# Patient Record
Sex: MALE | NOT HISPANIC OR LATINO | Employment: FULL TIME | URBAN - METROPOLITAN AREA
[De-identification: names, ages, dates, MRNs, and addresses within clinical notes are randomized per-mention and may not be internally consistent; named-entity substitution may affect disease eponyms.]

---

## 2023-06-06 ENCOUNTER — TELEPHONE (OUTPATIENT)
Dept: PRIMARY CARE | Facility: CLINIC | Age: 48
End: 2023-06-06
Payer: COMMERCIAL

## 2023-06-06 DIAGNOSIS — F90.9 ATTENTION DEFICIT HYPERACTIVITY DISORDER (ADHD), UNSPECIFIED ADHD TYPE: Primary | ICD-10-CM

## 2023-06-06 PROBLEM — I10 HYPERTENSION: Status: ACTIVE | Noted: 2023-06-06

## 2023-06-06 PROBLEM — F41.9 ANXIETY: Status: ACTIVE | Noted: 2023-06-06

## 2023-06-06 PROBLEM — R63.5 WEIGHT GAIN: Status: ACTIVE | Noted: 2023-06-06

## 2023-06-06 PROBLEM — E78.5 BORDERLINE HYPERLIPIDEMIA: Status: ACTIVE | Noted: 2023-06-06

## 2023-06-06 PROBLEM — N40.0 ENLARGED PROSTATE: Status: ACTIVE | Noted: 2023-06-06

## 2023-06-06 PROBLEM — E55.9 VITAMIN D DEFICIENCY: Status: ACTIVE | Noted: 2023-06-06

## 2023-06-06 RX ORDER — LISINOPRIL 20 MG/1
1 TABLET ORAL DAILY
COMMUNITY
Start: 2014-12-04 | End: 2023-07-03 | Stop reason: SDUPTHER

## 2023-06-06 RX ORDER — BUSPIRONE HYDROCHLORIDE 15 MG/1
15 TABLET ORAL 2 TIMES DAILY
COMMUNITY
Start: 2017-04-10 | End: 2023-07-03 | Stop reason: SDUPTHER

## 2023-06-06 RX ORDER — DEXTROAMPHETAMINE SACCHARATE, AMPHETAMINE ASPARTATE, DEXTROAMPHETAMINE SULFATE AND AMPHETAMINE SULFATE 3.75; 3.75; 3.75; 3.75 MG/1; MG/1; MG/1; MG/1
1 TABLET ORAL 2 TIMES DAILY
COMMUNITY
Start: 2023-05-02 | End: 2023-06-10 | Stop reason: SDUPTHER

## 2023-06-06 NOTE — TELEPHONE ENCOUNTER
Refill(s) requested for: Generic Adderall (15 mg)    Pharmacy: [facility]  Pharmacy Address: [id number] [facility]    LR: 04/24/2023  LV: 12/27/2022  NV: 07/03/2023

## 2023-06-10 RX ORDER — DEXTROAMPHETAMINE SACCHARATE, AMPHETAMINE ASPARTATE, DEXTROAMPHETAMINE SULFATE AND AMPHETAMINE SULFATE 3.75; 3.75; 3.75; 3.75 MG/1; MG/1; MG/1; MG/1
1 TABLET ORAL 2 TIMES DAILY
Qty: 60 TABLET | Refills: 0 | Status: SHIPPED | OUTPATIENT
Start: 2023-06-10 | End: 2023-07-03 | Stop reason: RX

## 2023-07-03 ENCOUNTER — OFFICE VISIT (OUTPATIENT)
Dept: PRIMARY CARE | Facility: CLINIC | Age: 48
End: 2023-07-03
Payer: COMMERCIAL

## 2023-07-03 VITALS
HEART RATE: 62 BPM | DIASTOLIC BLOOD PRESSURE: 78 MMHG | BODY MASS INDEX: 31.86 KG/M2 | OXYGEN SATURATION: 97 % | SYSTOLIC BLOOD PRESSURE: 122 MMHG | WEIGHT: 203 LBS | HEIGHT: 67 IN

## 2023-07-03 DIAGNOSIS — N40.0 BENIGN PROSTATIC HYPERPLASIA, UNSPECIFIED WHETHER LOWER URINARY TRACT SYMPTOMS PRESENT: ICD-10-CM

## 2023-07-03 DIAGNOSIS — F90.0 ATTENTION DEFICIT HYPERACTIVITY DISORDER (ADHD), PREDOMINANTLY INATTENTIVE TYPE: Primary | ICD-10-CM

## 2023-07-03 DIAGNOSIS — I10 HYPERTENSION, UNSPECIFIED TYPE: ICD-10-CM

## 2023-07-03 DIAGNOSIS — E29.1 HYPOGONADISM MALE: ICD-10-CM

## 2023-07-03 DIAGNOSIS — R53.83 FATIGUE, UNSPECIFIED TYPE: ICD-10-CM

## 2023-07-03 DIAGNOSIS — F41.9 ANXIETY: ICD-10-CM

## 2023-07-03 DIAGNOSIS — Z00.00 ENCOUNTER FOR HEALTH MAINTENANCE EXAMINATION: ICD-10-CM

## 2023-07-03 DIAGNOSIS — Z13.220 SCREENING, LIPID: ICD-10-CM

## 2023-07-03 DIAGNOSIS — E55.9 VITAMIN D DEFICIENCY: ICD-10-CM

## 2023-07-03 PROCEDURE — 99213 OFFICE O/P EST LOW 20 MIN: CPT | Performed by: FAMILY MEDICINE

## 2023-07-03 PROCEDURE — 3074F SYST BP LT 130 MM HG: CPT | Performed by: FAMILY MEDICINE

## 2023-07-03 PROCEDURE — 3078F DIAST BP <80 MM HG: CPT | Performed by: FAMILY MEDICINE

## 2023-07-03 RX ORDER — LISINOPRIL 20 MG/1
20 TABLET ORAL DAILY
Qty: 90 TABLET | Refills: 2 | Status: SHIPPED | OUTPATIENT
Start: 2023-07-03 | End: 2023-12-29 | Stop reason: SDUPTHER

## 2023-07-03 RX ORDER — DEXTROAMPHETAMINE SACCHARATE, AMPHETAMINE ASPARTATE MONOHYDRATE, DEXTROAMPHETAMINE SULFATE AND AMPHETAMINE SULFATE 3.75; 3.75; 3.75; 3.75 MG/1; MG/1; MG/1; MG/1
15 CAPSULE, EXTENDED RELEASE ORAL EVERY MORNING
Qty: 30 CAPSULE | Refills: 0 | Status: SHIPPED | OUTPATIENT
Start: 2023-09-02 | End: 2023-07-11 | Stop reason: SDUPTHER

## 2023-07-03 RX ORDER — DEXTROAMPHETAMINE SACCHARATE, AMPHETAMINE ASPARTATE MONOHYDRATE, DEXTROAMPHETAMINE SULFATE AND AMPHETAMINE SULFATE 3.75; 3.75; 3.75; 3.75 MG/1; MG/1; MG/1; MG/1
15 CAPSULE, EXTENDED RELEASE ORAL EVERY MORNING
Qty: 30 CAPSULE | Refills: 0 | Status: SHIPPED | OUTPATIENT
Start: 2023-07-08 | End: 2023-07-11 | Stop reason: SDUPTHER

## 2023-07-03 RX ORDER — BUSPIRONE HYDROCHLORIDE 15 MG/1
15 TABLET ORAL DAILY PRN
Qty: 90 TABLET | Refills: 2 | Status: SHIPPED | OUTPATIENT
Start: 2023-07-03

## 2023-07-03 RX ORDER — DEXTROAMPHETAMINE SACCHARATE, AMPHETAMINE ASPARTATE MONOHYDRATE, DEXTROAMPHETAMINE SULFATE AND AMPHETAMINE SULFATE 3.75; 3.75; 3.75; 3.75 MG/1; MG/1; MG/1; MG/1
15 CAPSULE, EXTENDED RELEASE ORAL EVERY MORNING
Qty: 30 CAPSULE | Refills: 0 | Status: SHIPPED | OUTPATIENT
Start: 2023-08-05 | End: 2023-07-11 | Stop reason: SDUPTHER

## 2023-07-03 ASSESSMENT — PATIENT HEALTH QUESTIONNAIRE - PHQ9
SUM OF ALL RESPONSES TO PHQ9 QUESTIONS 1 & 2: 0
1. LITTLE INTEREST OR PLEASURE IN DOING THINGS: NOT AT ALL
2. FEELING DOWN, DEPRESSED OR HOPELESS: NOT AT ALL

## 2023-07-03 NOTE — PROGRESS NOTES
General Medical Management Note    47 y.o. male presents for Medical Management  HPI    Temporarily without medical insurance.    ADD is well controlled with Adderall.  He is having difficulty finding the medication due to nationwide national shortage.  He prefers the extended release and states that seems to be easier to find an immediate release.  Extended release 30 mg is also difficult to find.  He denies side effects such as tremors, insomnia or palpitations.    Anxiety is well controlled.  He is only using BuSpar on an as-needed basis.    Hypertension: Fairly compliant with lisinopril.    Weight management: Patient is struggling to lose 10 to 15 pounds to get to overweight BMI.  We reviewed reasons for weight loss.    Past Medical History:   Diagnosis Date    Elevated blood-pressure reading, without diagnosis of hypertension 12/10/2013    Elevated blood pressure reading without diagnosis of hypertension    Fracture of unspecified phalanx of unspecified finger, initial encounter for closed fracture 12/10/2013    Closed fracture of one or more phalanges of hand    Personal history of urinary calculi 12/01/2017    History of renal calculi      Past Surgical History:   Procedure Laterality Date    ANTERIOR CRUCIATE LIGAMENT REPAIR  12/04/2014    Primary Repair Of Knee Ligament Cruciate Anterior     No family history on file.   Social History     Socioeconomic History    Marital status: Single     Spouse name: Not on file    Number of children: Not on file    Years of education: Not on file    Highest education level: Not on file   Occupational History    Not on file   Tobacco Use    Smoking status: Never    Smokeless tobacco: Current     Types: Chew   Substance and Sexual Activity    Alcohol use: Yes    Drug use: Never    Sexual activity: Not on file   Other Topics Concern    Not on file   Social History Narrative    Not on file     Social Determinants of Health     Financial Resource Strain: Not on file   Food  "Insecurity: Not on file   Transportation Needs: Not on file   Physical Activity: Not on file   Stress: Not on file   Social Connections: Not on file   Intimate Partner Violence: Not on file   Housing Stability: Not on file       Current Outpatient Medications on File Prior to Visit   Medication Sig Dispense Refill    [DISCONTINUED] amphetamine-dextroamphetamine (Adderall) 15 mg tablet Take 1 tablet (15 mg) by mouth 2 times a day. 60 tablet 0    [DISCONTINUED] busPIRone (Buspar) 15 mg tablet Take 1 tablet (15 mg) by mouth twice a day.      [DISCONTINUED] lisinopril 20 mg tablet Take 1 tablet (20 mg) by mouth once daily.       No current facility-administered medications on file prior to visit.       Allergies   Allergen Reactions    Penicillins Unknown         ROS: Denies chest pain, SOB, Headache, GI problems     Visit Vitals  /78   Pulse 62   Ht 1.702 m (5' 7\")   Wt 92.1 kg (203 lb)   SpO2 97%   BMI 31.79 kg/m²   Smoking Status Never   BSA 2.09 m²        PHYSICAL EXAM:  Alert and oriented x3.  Eyes: EOM grossly intact  Neck supple without lymph adenopathy or carotid bruit.  No masses or thyromegaly  Heart regular rate and rhythm without murmur.  Lungs clear to auscultation.  Legs without edema.  Gait is non-antalgic  Speech clear.  Hearing adequate.          DIAGNOSIS/PLAN:    1. Attention deficit hyperactivity disorder (ADHD), predominantly inattentive type  Patient is aware of Dr. Lynn's and Lara García's practice rules for use of scheduled medication.  Has a signed contract stating that patient will only receive controlled substance prescriptions from Dr. Lynn, will only receive a one month supply, will fill prescriptions at one pharmacy, and agrees to a random urine drug screen.  Patient is aware that she must have an office appointment every 90 days to continue to receive benzodiazepines or narcotics.    -3 prescriptions printed today so that patient can find a pharmacy that has this medication and " dose in stock.  -Send message when starting third prescription.  Patient is aware that 3 additional prescriptions will be printed or eRx'd as long as he has a future appointment.    - amphetamine-dextroamphetamine XR (Adderall XR) 15 mg 24 hr capsule; Take 1 capsule (15 mg) by mouth once daily in the morning. Do not crush or chew. Do not start before July 8, 2023.  Dispense: 30 capsule; Refill: 0  - amphetamine-dextroamphetamine XR (Adderall XR) 15 mg 24 hr capsule; Take 1 capsule (15 mg) by mouth once daily in the morning. Do not crush or chew. Do not start before August 5, 2023.  Dispense: 30 capsule; Refill: 0  - amphetamine-dextroamphetamine XR (Adderall XR) 15 mg 24 hr capsule; Take 1 capsule (15 mg) by mouth once daily in the morning. Do not crush or chew. Do not start before September 2, 2023.  Dispense: 30 capsule; Refill: 0    2. Anxiety  - busPIRone (Buspar) 15 mg tablet; Take 1 tablet (15 mg) by mouth once daily as needed (anxiety).  Dispense: 90 tablet; Refill: 2    3. Hypertension, unspecified type  Hypertension: Discussed importance of good blood pressure control to avoid long-term complications such as heart attack and stroke.  Patient is aware that blood pressure goal is less than 130/80.  Maintaining a regular exercise program and body mass index (BMI) less than 25 as well as a diet lower in carbohydrates will help reach these goals.  - ECG 12 lead (Clinic Performed)  - lisinopril 20 mg tablet; Take 1 tablet (20 mg) by mouth once daily.  Dispense: 90 tablet; Refill: 2    4. Vitamin D deficiency  - Vitamin D 1,25 Dihydroxy; Future    5. Encounter for health maintenance examination  Lab will be rescheduled to coincide with his annual comprehensive medical evaluation later this year  - Comprehensive Metabolic Panel; Future  - CBC; Future  - Lipid Panel; Future  - TSH with reflex to Free T4 if abnormal; Future  - Testosterone; Future  - Prostate Specific Antigen; Future  - Vitamin D 1,25 Dihydroxy;  Future  - Urinalysis with Reflex Microscopic; Future        Return to office in 6 months for comprehensive medical evaluation, long-term medication use monitoring, and preventative services screening    We will continue to monitor, evaluate, assess and treat all problems/diagnoses as appropriate and continue to collaborate with specialists.    Encouraged to sign up with [facility]    Contact office or send a Follow  Health message with any questions or concerns    Patient will only be notified of labs that require medical intervention.    Prescriptions will not be filled unless you are compliant with your follow up appointments or have a follow up appointment scheduled as per instruction of your physician. Refills should be requested at the time of your visit.    **Charting was completed using voice recognition technology and may include unintended errors**    Harlan Lynn DO, ROSA  [address] [facility], [address]  [place] [address] [phone]          Harlan Lynn DO, JOHANNAP

## 2023-07-10 ENCOUNTER — TELEPHONE (OUTPATIENT)
Dept: PRIMARY CARE | Facility: CLINIC | Age: 48
End: 2023-07-10
Payer: COMMERCIAL

## 2023-07-10 DIAGNOSIS — F90.0 ATTENTION DEFICIT HYPERACTIVITY DISORDER (ADHD), PREDOMINANTLY INATTENTIVE TYPE: ICD-10-CM

## 2023-07-10 NOTE — TELEPHONE ENCOUNTER
Pt is calling in regards to his prescriptions that you printed for his Adderall. Pt states that he takes Amphetamine - Dextroamphetamine XR 15 MG twice a day. On the prescriptions you only gave him 30 tablets instead of 60 tablets. Pt is asking if you can correct all three prescriptions? Pt said he will bring in the three prescriptions that he has when he comes in to  the new ones. Pt asked for them to be printed and if someone can give him a call when they are ready?

## 2023-07-11 RX ORDER — DEXTROAMPHETAMINE SACCHARATE, AMPHETAMINE ASPARTATE MONOHYDRATE, DEXTROAMPHETAMINE SULFATE AND AMPHETAMINE SULFATE 3.75; 3.75; 3.75; 3.75 MG/1; MG/1; MG/1; MG/1
15 CAPSULE, EXTENDED RELEASE ORAL 2 TIMES DAILY
Qty: 60 CAPSULE | Refills: 0 | Status: SHIPPED | OUTPATIENT
Start: 2023-07-11 | End: 2023-07-12

## 2023-07-12 DIAGNOSIS — F90.0 ATTENTION DEFICIT HYPERACTIVITY DISORDER (ADHD), PREDOMINANTLY INATTENTIVE TYPE: ICD-10-CM

## 2023-07-12 RX ORDER — DEXTROAMPHETAMINE SACCHARATE, AMPHETAMINE ASPARTATE MONOHYDRATE, DEXTROAMPHETAMINE SULFATE AND AMPHETAMINE SULFATE 3.75; 3.75; 3.75; 3.75 MG/1; MG/1; MG/1; MG/1
15 CAPSULE, EXTENDED RELEASE ORAL 2 TIMES DAILY
Qty: 60 CAPSULE | Refills: 0 | Status: SHIPPED | OUTPATIENT
Start: 2023-07-12 | End: 2023-10-16 | Stop reason: SDUPTHER

## 2023-07-12 RX ORDER — DEXTROAMPHETAMINE SACCHARATE, AMPHETAMINE ASPARTATE MONOHYDRATE, DEXTROAMPHETAMINE SULFATE AND AMPHETAMINE SULFATE 3.75; 3.75; 3.75; 3.75 MG/1; MG/1; MG/1; MG/1
15 CAPSULE, EXTENDED RELEASE ORAL 2 TIMES DAILY
Qty: 60 CAPSULE | Refills: 0 | Status: SHIPPED | OUTPATIENT
Start: 2023-09-06 | End: 2023-10-16 | Stop reason: SDUPTHER

## 2023-07-12 RX ORDER — DEXTROAMPHETAMINE SACCHARATE, AMPHETAMINE ASPARTATE MONOHYDRATE, DEXTROAMPHETAMINE SULFATE AND AMPHETAMINE SULFATE 3.75; 3.75; 3.75; 3.75 MG/1; MG/1; MG/1; MG/1
15 CAPSULE, EXTENDED RELEASE ORAL 2 TIMES DAILY
Qty: 60 CAPSULE | Refills: 0 | Status: SHIPPED | OUTPATIENT
Start: 2023-08-09 | End: 2023-10-16 | Stop reason: SDUPTHER

## 2023-10-16 ENCOUNTER — TELEPHONE (OUTPATIENT)
Dept: PRIMARY CARE | Facility: CLINIC | Age: 48
End: 2023-10-16
Payer: COMMERCIAL

## 2023-10-16 DIAGNOSIS — F90.0 ATTENTION DEFICIT HYPERACTIVITY DISORDER (ADHD), PREDOMINANTLY INATTENTIVE TYPE: ICD-10-CM

## 2023-10-16 RX ORDER — DEXTROAMPHETAMINE SACCHARATE, AMPHETAMINE ASPARTATE MONOHYDRATE, DEXTROAMPHETAMINE SULFATE AND AMPHETAMINE SULFATE 3.75; 3.75; 3.75; 3.75 MG/1; MG/1; MG/1; MG/1
15 CAPSULE, EXTENDED RELEASE ORAL 2 TIMES DAILY
Qty: 60 CAPSULE | Refills: 0 | Status: SHIPPED | OUTPATIENT
Start: 2023-11-14 | End: 2023-12-29 | Stop reason: SDUPTHER

## 2023-10-16 RX ORDER — DEXTROAMPHETAMINE SACCHARATE, AMPHETAMINE ASPARTATE MONOHYDRATE, DEXTROAMPHETAMINE SULFATE AND AMPHETAMINE SULFATE 3.75; 3.75; 3.75; 3.75 MG/1; MG/1; MG/1; MG/1
15 CAPSULE, EXTENDED RELEASE ORAL 2 TIMES DAILY
Qty: 60 CAPSULE | Refills: 0 | Status: SHIPPED | OUTPATIENT
Start: 2023-10-17 | End: 2023-12-29 | Stop reason: SDUPTHER

## 2023-10-16 RX ORDER — DEXTROAMPHETAMINE SACCHARATE, AMPHETAMINE ASPARTATE MONOHYDRATE, DEXTROAMPHETAMINE SULFATE AND AMPHETAMINE SULFATE 3.75; 3.75; 3.75; 3.75 MG/1; MG/1; MG/1; MG/1
15 CAPSULE, EXTENDED RELEASE ORAL 2 TIMES DAILY
Qty: 60 CAPSULE | Refills: 0 | Status: SHIPPED | OUTPATIENT
Start: 2023-12-12 | End: 2023-12-29 | Stop reason: SDUPTHER

## 2023-10-16 NOTE — TELEPHONE ENCOUNTER
REFILL REQUEST    Med: Generic Adderall XR  Med Dose: 15 mg  Med Frequency: one cap twice daily    Pharmacy: PT REQUESTED THIS BE PRINTED  Pharmacy Address: PT REQUESTED THIS BE PRINTED    LR: 09/06/2023  LV: 07/03/2023  NV: 12/29/2023

## 2023-12-29 ENCOUNTER — OFFICE VISIT (OUTPATIENT)
Dept: PRIMARY CARE | Facility: CLINIC | Age: 48
End: 2023-12-29
Payer: COMMERCIAL

## 2023-12-29 VITALS
OXYGEN SATURATION: 99 % | HEART RATE: 67 BPM | BODY MASS INDEX: 31.08 KG/M2 | DIASTOLIC BLOOD PRESSURE: 72 MMHG | WEIGHT: 198 LBS | SYSTOLIC BLOOD PRESSURE: 120 MMHG | HEIGHT: 67 IN

## 2023-12-29 DIAGNOSIS — Z23 NEED FOR INFLUENZA VACCINATION: ICD-10-CM

## 2023-12-29 DIAGNOSIS — I10 HYPERTENSION, UNSPECIFIED TYPE: ICD-10-CM

## 2023-12-29 DIAGNOSIS — E66.09 CLASS 1 OBESITY DUE TO EXCESS CALORIES WITH SERIOUS COMORBIDITY AND BODY MASS INDEX (BMI) OF 31.0 TO 31.9 IN ADULT: ICD-10-CM

## 2023-12-29 DIAGNOSIS — F90.0 ATTENTION DEFICIT HYPERACTIVITY DISORDER (ADHD), PREDOMINANTLY INATTENTIVE TYPE: Primary | ICD-10-CM

## 2023-12-29 DIAGNOSIS — Z51.81 ENCOUNTER FOR THERAPEUTIC DRUG LEVEL MONITORING: ICD-10-CM

## 2023-12-29 DIAGNOSIS — Z02.83 ENCOUNTER FOR DRUG SCREENING: ICD-10-CM

## 2023-12-29 PROBLEM — E66.811 CLASS 1 OBESITY DUE TO EXCESS CALORIES WITH SERIOUS COMORBIDITY AND BODY MASS INDEX (BMI) OF 31.0 TO 31.9 IN ADULT: Status: ACTIVE | Noted: 2023-12-29

## 2023-12-29 PROCEDURE — 90471 IMMUNIZATION ADMIN: CPT | Performed by: FAMILY MEDICINE

## 2023-12-29 PROCEDURE — 90686 IIV4 VACC NO PRSV 0.5 ML IM: CPT | Performed by: FAMILY MEDICINE

## 2023-12-29 PROCEDURE — 99214 OFFICE O/P EST MOD 30 MIN: CPT | Performed by: FAMILY MEDICINE

## 2023-12-29 PROCEDURE — 3074F SYST BP LT 130 MM HG: CPT | Performed by: FAMILY MEDICINE

## 2023-12-29 PROCEDURE — 3078F DIAST BP <80 MM HG: CPT | Performed by: FAMILY MEDICINE

## 2023-12-29 PROCEDURE — 3008F BODY MASS INDEX DOCD: CPT | Performed by: FAMILY MEDICINE

## 2023-12-29 RX ORDER — LISINOPRIL 20 MG/1
20 TABLET ORAL DAILY
Qty: 90 TABLET | Refills: 2 | Status: SHIPPED | OUTPATIENT
Start: 2023-12-29

## 2023-12-29 RX ORDER — DEXTROAMPHETAMINE SACCHARATE, AMPHETAMINE ASPARTATE MONOHYDRATE, DEXTROAMPHETAMINE SULFATE AND AMPHETAMINE SULFATE 3.75; 3.75; 3.75; 3.75 MG/1; MG/1; MG/1; MG/1
15 CAPSULE, EXTENDED RELEASE ORAL 2 TIMES DAILY
Qty: 60 CAPSULE | Refills: 0 | Status: SHIPPED | OUTPATIENT
Start: 2024-01-21 | End: 2024-04-18 | Stop reason: SDUPTHER

## 2023-12-29 RX ORDER — DEXTROAMPHETAMINE SACCHARATE, AMPHETAMINE ASPARTATE MONOHYDRATE, DEXTROAMPHETAMINE SULFATE AND AMPHETAMINE SULFATE 3.75; 3.75; 3.75; 3.75 MG/1; MG/1; MG/1; MG/1
15 CAPSULE, EXTENDED RELEASE ORAL 2 TIMES DAILY
Qty: 60 CAPSULE | Refills: 0 | Status: SHIPPED | OUTPATIENT
Start: 2024-03-19 | End: 2024-04-18 | Stop reason: SDUPTHER

## 2023-12-29 RX ORDER — DEXTROAMPHETAMINE SACCHARATE, AMPHETAMINE ASPARTATE MONOHYDRATE, DEXTROAMPHETAMINE SULFATE AND AMPHETAMINE SULFATE 3.75; 3.75; 3.75; 3.75 MG/1; MG/1; MG/1; MG/1
15 CAPSULE, EXTENDED RELEASE ORAL 2 TIMES DAILY
Qty: 60 CAPSULE | Refills: 0 | Status: SHIPPED | OUTPATIENT
Start: 2024-02-20 | End: 2024-04-18 | Stop reason: SDUPTHER

## 2023-12-29 ASSESSMENT — PATIENT HEALTH QUESTIONNAIRE - PHQ9
1. LITTLE INTEREST OR PLEASURE IN DOING THINGS: NOT AT ALL
2. FEELING DOWN, DEPRESSED OR HOPELESS: NOT AT ALL
SUM OF ALL RESPONSES TO PHQ9 QUESTIONS 1 & 2: 0

## 2023-12-29 NOTE — PROGRESS NOTES
General Medical Management Note    48 y.o. male presents for Medical Management  HPI    ADD - twice daily Adderall XR.  Last dose today.  The medication continues to be beneficial with task completion, comprehension and organization.    Anxiety - no longer needs BusPar daily.  He occasionally takes the medication when he feels anxious.  His last dose was several weeks ago.    HTN - controlled with lisinopril 20 mg daily.  He had tried not taking the medication but found his blood pressure significantly elevated and has been compliant with medication ever since.    Obesity - losing weight.  Job very active.  Diet: portion control    Past Medical History:   Diagnosis Date    Elevated blood-pressure reading, without diagnosis of hypertension 12/10/2013    Elevated blood pressure reading without diagnosis of hypertension    Fracture of unspecified phalanx of unspecified finger, initial encounter for closed fracture 12/10/2013    Closed fracture of one or more phalanges of hand    Personal history of urinary calculi 12/01/2017    History of renal calculi      Past Surgical History:   Procedure Laterality Date    ANTERIOR CRUCIATE LIGAMENT REPAIR  12/04/2014    Primary Repair Of Knee Ligament Cruciate Anterior     No family history on file.   Social History     Socioeconomic History    Marital status: Single     Spouse name: Not on file    Number of children: Not on file    Years of education: Not on file    Highest education level: Not on file   Occupational History    Not on file   Tobacco Use    Smoking status: Never    Smokeless tobacco: Current     Types: Chew   Substance and Sexual Activity    Alcohol use: Yes    Drug use: Never    Sexual activity: Not on file   Other Topics Concern    Not on file   Social History Narrative    Not on file     Social Determinants of Health     Financial Resource Strain: Not on file   Food Insecurity: Not on file   Transportation Needs: Not on file   Physical Activity: Not on file  "  Stress: Not on file   Social Connections: Not on file   Intimate Partner Violence: Not on file   Housing Stability: Not on file       Current Outpatient Medications on File Prior to Visit   Medication Sig Dispense Refill    amphetamine-dextroamphetamine XR (Adderall XR) 15 mg 24 hr capsule Take 1 capsule (15 mg) by mouth 2 times a day. Do not crush or chew. Do not start before December 12, 2023. 60 capsule 0    busPIRone (Buspar) 15 mg tablet Take 1 tablet (15 mg) by mouth once daily as needed (anxiety). 90 tablet 2    lisinopril 20 mg tablet Take 1 tablet (20 mg) by mouth once daily. 90 tablet 2    amphetamine-dextroamphetamine XR (Adderall XR) 15 mg 24 hr capsule Take 1 capsule (15 mg) by mouth 2 times a day. Do not crush or chew. Do not start before November 14, 2023. 60 capsule 0    amphetamine-dextroamphetamine XR (Adderall XR) 15 mg 24 hr capsule Take 1 capsule (15 mg) by mouth 2 times a day. Do not crush or chew. Do not start before October 17, 2023. 60 capsule 0     No current facility-administered medications on file prior to visit.       Allergies   Allergen Reactions    Penicillins Unknown         ROS: Denies chest pain, SOB, Headache, GI problems     Visit Vitals  /72   Pulse 67   Ht 1.702 m (5' 7\")   Wt 89.8 kg (198 lb)   SpO2 99%   BMI 31.01 kg/m²   Smoking Status Never   BSA 2.06 m²        PHYSICAL EXAM:  Alert and oriented x3.  Eyes: EOM grossly intact  Neck supple without lymph adenopathy or carotid bruit.  No masses or thyromegaly  Heart regular rate and rhythm without murmur.  Lungs clear to auscultation.  Legs without edema.  Gait is non-antalgic  Speech clear.  Hearing adequate.          DIAGNOSIS/PLAN:  1. Attention deficit hyperactivity disorder (ADHD), predominantly inattentive type  Prescriptions were printed at patient request.  Due to availability issues at pharmacies.    - amphetamine-dextroamphetamine XR (Adderall XR) 15 mg 24 hr capsule; Take 1 capsule (15 mg) by mouth 2 times a " day. Do not crush or chew. Do not start before January 21, 2024.  Dispense: 60 capsule; Refill: 0  - amphetamine-dextroamphetamine XR (Adderall XR) 15 mg 24 hr capsule; Take 1 capsule (15 mg) by mouth 2 times a day. Do not crush or chew. Do not start before February 20, 2024.  Dispense: 60 capsule; Refill: 0  - amphetamine-dextroamphetamine XR (Adderall XR) 15 mg 24 hr capsule; Take 1 capsule (15 mg) by mouth 2 times a day. Do not crush or chew. Do not start before March 19, 2024.  Dispense: 60 capsule; Refill: 0    2. Hypertension, unspecified type  Hypertension: Discussed importance of good blood pressure control to avoid long-term complications such as heart attack and stroke.  Patient is aware that blood pressure goal is less than 130/80.  Maintaining a regular exercise program and body mass index (BMI) less than 25 as well as a diet lower in carbohydrates will help reach these goals.  - Comprehensive Metabolic Panel; Future  - lisinopril 20 mg tablet; Take 1 tablet (20 mg) by mouth once daily.  Dispense: 90 tablet; Refill: 2    3. Class 1 obesity due to excess calories with serious comorbidity and body mass index (BMI) of 31.0 to 31.9 in adult  Patient encouraged to commit to a diet of lower carbohydrates and increase vegetable and fruit intake. Patient also encouraged to increase water intake to 80 ounces/day. Continue exercise for at least 30 minutes a day on most days of the week.  Sustained obesity leads to increased risk for multiple medical problems including heart attack, stroke, cancer and infection.  For more assistance and weight loss options, go to the website: Yourweightmatters.org.  Additional resources:  RethinkObesity.com, obesity.org, obesityaction.org, BetterHelp.[employer]    4. Encounter for therapeutic drug level monitoring  - Drug Screen, Urine With Reflex to Confirmation; Future  - Amphetamine Confirm, Urine; Future    5. Encounter for drug screening  - Drug Screen, Urine With Reflex to  Confirmation; Future  - Amphetamine Confirm, Urine; Future    6. Need for influenza vaccination  - Flu vaccine (IIV4) age 6 months and greater, preservative free        Follow up in 6 months for medical management    I will continue to monitor, evaluate, assess and treat all problems/diagnoses as appropriate and continue to collaborate with specialists.    Contact office or send a  MY Chart message with any questions or concerns    Encouraged to sign up with my  My Chart  Patient will only be notified of labs that require medical intervention.    Prescriptions will not be filled unless you are compliant with your follow up appointments or have a follow up appointment scheduled as per instruction of your physician. Refills should be requested at the time of your visit.    **Charting was completed using voice recognition technology and may include unintended errors**    Harlan Lynn DO, ROSA  [address] [facility]  [place] [address] [phone]  Harlan Lynn DO, JOHANNAP

## 2024-04-15 ENCOUNTER — TELEPHONE (OUTPATIENT)
Dept: PRIMARY CARE | Facility: CLINIC | Age: 49
End: 2024-04-15
Payer: COMMERCIAL

## 2024-04-15 DIAGNOSIS — F90.0 ATTENTION DEFICIT HYPERACTIVITY DISORDER (ADHD), PREDOMINANTLY INATTENTIVE TYPE: ICD-10-CM

## 2024-04-15 NOTE — TELEPHONE ENCOUNTER
REFILL REQUEST    Med: Adderall XR  Med Dose: 15 mg  Med Frequency: one cap twice daily    Pharmacy: patient requested paper scripts    LR: 03/19/2024  LV: 12/29/2024  NV: 07/08/2024

## 2024-04-18 RX ORDER — DEXTROAMPHETAMINE SACCHARATE, AMPHETAMINE ASPARTATE MONOHYDRATE, DEXTROAMPHETAMINE SULFATE AND AMPHETAMINE SULFATE 3.75; 3.75; 3.75; 3.75 MG/1; MG/1; MG/1; MG/1
15 CAPSULE, EXTENDED RELEASE ORAL 2 TIMES DAILY
Qty: 60 CAPSULE | Refills: 0 | Status: SHIPPED | OUTPATIENT
Start: 2024-05-18 | End: 2024-06-17

## 2024-04-18 RX ORDER — DEXTROAMPHETAMINE SACCHARATE, AMPHETAMINE ASPARTATE MONOHYDRATE, DEXTROAMPHETAMINE SULFATE AND AMPHETAMINE SULFATE 3.75; 3.75; 3.75; 3.75 MG/1; MG/1; MG/1; MG/1
15 CAPSULE, EXTENDED RELEASE ORAL 2 TIMES DAILY
Qty: 60 CAPSULE | Refills: 0 | Status: SHIPPED | OUTPATIENT
Start: 2024-04-19 | End: 2024-04-19 | Stop reason: SDUPTHER

## 2024-04-18 RX ORDER — DEXTROAMPHETAMINE SACCHARATE, AMPHETAMINE ASPARTATE MONOHYDRATE, DEXTROAMPHETAMINE SULFATE AND AMPHETAMINE SULFATE 3.75; 3.75; 3.75; 3.75 MG/1; MG/1; MG/1; MG/1
15 CAPSULE, EXTENDED RELEASE ORAL 2 TIMES DAILY
Qty: 60 CAPSULE | Refills: 0 | Status: SHIPPED | OUTPATIENT
Start: 2024-06-15 | End: 2024-07-15

## 2024-04-19 DIAGNOSIS — F90.0 ATTENTION DEFICIT HYPERACTIVITY DISORDER (ADHD), PREDOMINANTLY INATTENTIVE TYPE: ICD-10-CM

## 2024-04-19 RX ORDER — DEXTROAMPHETAMINE SACCHARATE, AMPHETAMINE ASPARTATE MONOHYDRATE, DEXTROAMPHETAMINE SULFATE AND AMPHETAMINE SULFATE 3.75; 3.75; 3.75; 3.75 MG/1; MG/1; MG/1; MG/1
15 CAPSULE, EXTENDED RELEASE ORAL 2 TIMES DAILY
Qty: 60 CAPSULE | Refills: 0 | Status: SHIPPED | OUTPATIENT
Start: 2024-04-19 | End: 2024-05-19

## 2024-07-01 ENCOUNTER — LAB (OUTPATIENT)
Dept: LAB | Facility: LAB | Age: 49
End: 2024-07-01
Payer: COMMERCIAL

## 2024-07-01 DIAGNOSIS — Z02.83 ENCOUNTER FOR DRUG SCREENING: ICD-10-CM

## 2024-07-01 DIAGNOSIS — E55.9 VITAMIN D DEFICIENCY: ICD-10-CM

## 2024-07-01 DIAGNOSIS — Z51.81 ENCOUNTER FOR THERAPEUTIC DRUG LEVEL MONITORING: ICD-10-CM

## 2024-07-01 DIAGNOSIS — I10 HYPERTENSION, UNSPECIFIED TYPE: ICD-10-CM

## 2024-07-01 DIAGNOSIS — N40.0 BENIGN PROSTATIC HYPERPLASIA, UNSPECIFIED WHETHER LOWER URINARY TRACT SYMPTOMS PRESENT: ICD-10-CM

## 2024-07-01 DIAGNOSIS — E29.1 HYPOGONADISM MALE: ICD-10-CM

## 2024-07-01 DIAGNOSIS — R39.198 SLOWING OF URINARY STREAM: ICD-10-CM

## 2024-07-01 DIAGNOSIS — E78.5 BORDERLINE HYPERLIPIDEMIA: ICD-10-CM

## 2024-07-01 LAB
25(OH)D3 SERPL-MCNC: 32 NG/ML (ref 30–100)
ALBUMIN SERPL BCP-MCNC: 4.6 G/DL (ref 3.4–5)
ALP SERPL-CCNC: 58 U/L (ref 33–120)
ALT SERPL W P-5'-P-CCNC: 24 U/L (ref 10–52)
AMPHETAMINES UR QL SCN: ABNORMAL
ANION GAP SERPL CALC-SCNC: 8 MMOL/L (ref 10–20)
APPEARANCE UR: CLEAR
AST SERPL W P-5'-P-CCNC: 18 U/L (ref 9–39)
BARBITURATES UR QL SCN: ABNORMAL
BENZODIAZ UR QL SCN: ABNORMAL
BILIRUB SERPL-MCNC: 1.2 MG/DL (ref 0–1.2)
BILIRUB UR STRIP.AUTO-MCNC: NEGATIVE MG/DL
BUN SERPL-MCNC: 17 MG/DL (ref 6–23)
BZE UR QL SCN: ABNORMAL
CALCIUM SERPL-MCNC: 9.9 MG/DL (ref 8.6–10.3)
CANNABINOIDS UR QL SCN: ABNORMAL
CHLORIDE SERPL-SCNC: 106 MMOL/L (ref 98–107)
CHOLEST SERPL-MCNC: 198 MG/DL (ref 0–199)
CHOLESTEROL/HDL RATIO: 3.3
CO2 SERPL-SCNC: 31 MMOL/L (ref 21–32)
COLOR UR: YELLOW
CREAT SERPL-MCNC: 1.22 MG/DL (ref 0.5–1.3)
EGFRCR SERPLBLD CKD-EPI 2021: 72 ML/MIN/1.73M*2
ERYTHROCYTE [DISTWIDTH] IN BLOOD BY AUTOMATED COUNT: 11.8 % (ref 11.5–14.5)
FENTANYL+NORFENTANYL UR QL SCN: ABNORMAL
GLUCOSE SERPL-MCNC: 96 MG/DL (ref 74–99)
GLUCOSE UR STRIP.AUTO-MCNC: NORMAL MG/DL
HCT VFR BLD AUTO: 48.1 % (ref 41–52)
HDLC SERPL-MCNC: 60.1 MG/DL
HGB BLD-MCNC: 17 G/DL (ref 13.5–17.5)
KETONES UR STRIP.AUTO-MCNC: NEGATIVE MG/DL
LDLC SERPL CALC-MCNC: 122 MG/DL
LEUKOCYTE ESTERASE UR QL STRIP.AUTO: NEGATIVE
MCH RBC QN AUTO: 31.5 PG (ref 26–34)
MCHC RBC AUTO-ENTMCNC: 35.3 G/DL (ref 32–36)
MCV RBC AUTO: 89 FL (ref 80–100)
METHADONE UR QL SCN: ABNORMAL
NITRITE UR QL STRIP.AUTO: NEGATIVE
NON HDL CHOLESTEROL: 138 MG/DL (ref 0–149)
NRBC BLD-RTO: 0 /100 WBCS (ref 0–0)
OPIATES UR QL SCN: ABNORMAL
OXYCODONE+OXYMORPHONE UR QL SCN: ABNORMAL
PCP UR QL SCN: ABNORMAL
PH UR STRIP.AUTO: 5 [PH]
PLATELET # BLD AUTO: 225 X10*3/UL (ref 150–450)
POTASSIUM SERPL-SCNC: 3.9 MMOL/L (ref 3.5–5.3)
PROT SERPL-MCNC: 7.3 G/DL (ref 6.4–8.2)
PROT UR STRIP.AUTO-MCNC: NEGATIVE MG/DL
PSA SERPL-MCNC: 2.16 NG/ML
RBC # BLD AUTO: 5.4 X10*6/UL (ref 4.5–5.9)
RBC # UR STRIP.AUTO: NEGATIVE /UL
SODIUM SERPL-SCNC: 141 MMOL/L (ref 136–145)
SP GR UR STRIP.AUTO: 1.03
TRIGL SERPL-MCNC: 78 MG/DL (ref 0–149)
TSH SERPL-ACNC: 1.36 MIU/L (ref 0.44–3.98)
UROBILINOGEN UR STRIP.AUTO-MCNC: NORMAL MG/DL
VLDL: 16 MG/DL (ref 0–40)
WBC # BLD AUTO: 7.9 X10*3/UL (ref 4.4–11.3)

## 2024-07-01 PROCEDURE — 36415 COLL VENOUS BLD VENIPUNCTURE: CPT

## 2024-07-01 PROCEDURE — 80324 DRUG SCREEN AMPHETAMINES 1/2: CPT

## 2024-07-01 PROCEDURE — 80061 LIPID PANEL: CPT

## 2024-07-01 PROCEDURE — 80307 DRUG TEST PRSMV CHEM ANLYZR: CPT

## 2024-07-01 PROCEDURE — 84403 ASSAY OF TOTAL TESTOSTERONE: CPT

## 2024-07-01 PROCEDURE — 85027 COMPLETE CBC AUTOMATED: CPT

## 2024-07-01 PROCEDURE — 84153 ASSAY OF PSA TOTAL: CPT

## 2024-07-01 PROCEDURE — 80053 COMPREHEN METABOLIC PANEL: CPT

## 2024-07-01 PROCEDURE — 84443 ASSAY THYROID STIM HORMONE: CPT

## 2024-07-01 PROCEDURE — 81003 URINALYSIS AUTO W/O SCOPE: CPT

## 2024-07-01 PROCEDURE — 82306 VITAMIN D 25 HYDROXY: CPT

## 2024-07-02 LAB — TESTOST SERPL-MCNC: 737 NG/DL (ref 240–1000)

## 2024-07-05 LAB
AMPHET UR-MCNC: >5000 NG/ML
MDA UR-MCNC: <200 NG/ML
MDEA UR-MCNC: <200 NG/ML
MDMA UR-MCNC: <200 NG/ML
METHAMPHET UR-MCNC: <200 NG/ML
PHENTERMINE UR CFM-MCNC: <200 NG/ML

## 2024-07-08 ENCOUNTER — APPOINTMENT (OUTPATIENT)
Dept: PRIMARY CARE | Facility: CLINIC | Age: 49
End: 2024-07-08
Payer: COMMERCIAL

## 2024-07-08 VITALS
DIASTOLIC BLOOD PRESSURE: 86 MMHG | OXYGEN SATURATION: 99 % | SYSTOLIC BLOOD PRESSURE: 120 MMHG | HEIGHT: 67 IN | BODY MASS INDEX: 31.86 KG/M2 | HEART RATE: 80 BPM | WEIGHT: 203 LBS

## 2024-07-08 DIAGNOSIS — E29.1 HYPOGONADISM MALE: ICD-10-CM

## 2024-07-08 DIAGNOSIS — Z12.11 COLON CANCER SCREENING: ICD-10-CM

## 2024-07-08 DIAGNOSIS — Z02.83 ENCOUNTER FOR DRUG SCREENING: ICD-10-CM

## 2024-07-08 DIAGNOSIS — F90.0 ATTENTION DEFICIT HYPERACTIVITY DISORDER (ADHD), PREDOMINANTLY INATTENTIVE TYPE: ICD-10-CM

## 2024-07-08 DIAGNOSIS — E66.09 CLASS 1 OBESITY DUE TO EXCESS CALORIES WITH SERIOUS COMORBIDITY AND BODY MASS INDEX (BMI) OF 31.0 TO 31.9 IN ADULT: ICD-10-CM

## 2024-07-08 DIAGNOSIS — N40.0 BENIGN PROSTATIC HYPERPLASIA, UNSPECIFIED WHETHER LOWER URINARY TRACT SYMPTOMS PRESENT: ICD-10-CM

## 2024-07-08 DIAGNOSIS — Z00.00 HEALTHCARE MAINTENANCE: Primary | ICD-10-CM

## 2024-07-08 DIAGNOSIS — E78.5 BORDERLINE HYPERLIPIDEMIA: ICD-10-CM

## 2024-07-08 DIAGNOSIS — E55.9 VITAMIN D DEFICIENCY: ICD-10-CM

## 2024-07-08 DIAGNOSIS — I10 HYPERTENSION, UNSPECIFIED TYPE: ICD-10-CM

## 2024-07-08 DIAGNOSIS — Z51.81 ENCOUNTER FOR THERAPEUTIC DRUG LEVEL MONITORING: ICD-10-CM

## 2024-07-08 DIAGNOSIS — R39.198 SLOWING OF URINARY STREAM: ICD-10-CM

## 2024-07-08 PROCEDURE — 3079F DIAST BP 80-89 MM HG: CPT | Performed by: FAMILY MEDICINE

## 2024-07-08 PROCEDURE — 3074F SYST BP LT 130 MM HG: CPT | Performed by: FAMILY MEDICINE

## 2024-07-08 PROCEDURE — 3008F BODY MASS INDEX DOCD: CPT | Performed by: FAMILY MEDICINE

## 2024-07-08 PROCEDURE — 93000 ELECTROCARDIOGRAM COMPLETE: CPT | Performed by: FAMILY MEDICINE

## 2024-07-08 PROCEDURE — 99213 OFFICE O/P EST LOW 20 MIN: CPT | Performed by: FAMILY MEDICINE

## 2024-07-08 PROCEDURE — 99396 PREV VISIT EST AGE 40-64: CPT | Performed by: FAMILY MEDICINE

## 2024-07-08 RX ORDER — LISINOPRIL 20 MG/1
20 TABLET ORAL DAILY
Qty: 90 TABLET | Refills: 2 | Status: SHIPPED | OUTPATIENT
Start: 2024-07-08

## 2024-07-08 RX ORDER — DEXTROAMPHETAMINE SACCHARATE, AMPHETAMINE ASPARTATE MONOHYDRATE, DEXTROAMPHETAMINE SULFATE AND AMPHETAMINE SULFATE 3.75; 3.75; 3.75; 3.75 MG/1; MG/1; MG/1; MG/1
15 CAPSULE, EXTENDED RELEASE ORAL 2 TIMES DAILY
Qty: 60 CAPSULE | Refills: 0 | Status: SHIPPED | OUTPATIENT
Start: 2024-07-22 | End: 2024-08-21

## 2024-07-08 RX ORDER — DEXTROAMPHETAMINE SACCHARATE, AMPHETAMINE ASPARTATE MONOHYDRATE, DEXTROAMPHETAMINE SULFATE AND AMPHETAMINE SULFATE 3.75; 3.75; 3.75; 3.75 MG/1; MG/1; MG/1; MG/1
15 CAPSULE, EXTENDED RELEASE ORAL 2 TIMES DAILY
Qty: 60 CAPSULE | Refills: 0 | Status: SHIPPED | OUTPATIENT
Start: 2024-09-16 | End: 2024-10-16

## 2024-07-08 RX ORDER — DEXTROAMPHETAMINE SACCHARATE, AMPHETAMINE ASPARTATE MONOHYDRATE, DEXTROAMPHETAMINE SULFATE AND AMPHETAMINE SULFATE 3.75; 3.75; 3.75; 3.75 MG/1; MG/1; MG/1; MG/1
15 CAPSULE, EXTENDED RELEASE ORAL 2 TIMES DAILY
Qty: 60 CAPSULE | Refills: 0 | Status: SHIPPED | OUTPATIENT
Start: 2024-08-19 | End: 2024-09-18

## 2024-07-08 NOTE — PROGRESS NOTES
Annual Comprehensive Medical Exam    48 y.o. male presents for annual comprehensive medical evaluation and preventive services screening.  No recent hospitalizations, surgeries or significant injuries.    HPI    No previous colonoscopy.  Dental visits rarely.  Ophthalmology visit 1-2 years ago, does not go regularly.      ADHD: Pt is being prescribed Adderall. Pt states that he can focus more.     Pt no longer taking Buspar. Pt feels his anxiety is under controlled.     Hypertension: Pt is being prescribed Lisinopril. Pt states that he takes this medication 3-4 times per week d/t forgetting to take it. BP controlled. Pt does not do any home BP monitoring.     Reviewed labs and EKG.    Pt states that he does not exercise regularly but he is very active with his job. Pt stacks products and builds displays. Pt's BMI this visit is 31.79. Pt states that he drinks a lot of regular Gatorade.     Pt c/o rt outer intermittent right outer wrist pain. Pain began 2 months ago. Worst when wrist is in a downward position for too long.  Pt states at times, it radiates towards his 3rd and 4th fingers causing pain and numbness. Pt has not tried any interventions.    Past Medical History:   Diagnosis Date    Elevated blood-pressure reading, without diagnosis of hypertension 12/10/2013    Elevated blood pressure reading without diagnosis of hypertension    Fracture of unspecified phalanx of unspecified finger, initial encounter for closed fracture 12/10/2013    Closed fracture of one or more phalanges of hand    Personal history of urinary calculi 12/01/2017    History of renal calculi      Past Surgical History:   Procedure Laterality Date    ANTERIOR CRUCIATE LIGAMENT REPAIR  12/04/2014    Primary Repair Of Knee Ligament Cruciate Anterior     No family history on file.   Social History     Socioeconomic History    Marital status: Single     Spouse name: Not on file    Number of children: Not on file    Years of education: Not on  "file    Highest education level: Not on file   Occupational History    Not on file   Tobacco Use    Smoking status: Never    Smokeless tobacco: Current     Types: Chew   Substance and Sexual Activity    Alcohol use: Yes    Drug use: Never    Sexual activity: Not on file   Other Topics Concern    Not on file   Social History Narrative    Not on file     Social Determinants of Health     Financial Resource Strain: Not on file   Food Insecurity: Not on file   Transportation Needs: Not on file   Physical Activity: Not on file   Stress: Not on file   Social Connections: Not on file   Intimate Partner Violence: Not on file   Housing Stability: Not on file       Current Outpatient Medications on File Prior to Visit   Medication Sig Dispense Refill    amphetamine-dextroamphetamine XR (Adderall XR) 15 mg 24 hr capsule Take 1 capsule (15 mg) by mouth 2 times a day. Do not crush or chew. Do not start before Judy 15, 2024. 60 capsule 0    amphetamine-dextroamphetamine XR (Adderall XR) 15 mg 24 hr capsule Take 1 capsule (15 mg) by mouth 2 times a day. Do not crush or chew. Do not start before May 18, 2024. 60 capsule 0    amphetamine-dextroamphetamine XR (Adderall XR) 15 mg 24 hr capsule Take 1 capsule (15 mg) by mouth 2 times a day. Do not crush or chew. 60 capsule 0    busPIRone (Buspar) 15 mg tablet Take 1 tablet (15 mg) by mouth once daily as needed (anxiety). (Patient not taking: Reported on 7/8/2024) 90 tablet 2    lisinopril 20 mg tablet Take 1 tablet (20 mg) by mouth once daily. 90 tablet 2     No current facility-administered medications on file prior to visit.       Allergies   Allergen Reactions    Penicillins Unknown         Review of Systems:  Complete review of systems is negative today except for that mentioned in the history of present illness.  In particular patient denies chest pain, shortness of breath, headaches and GI disturbances.      Visit Vitals  /86   Pulse 80   Ht 1.702 m (5' 7\")   Wt 92.1 kg " (203 lb)   SpO2 99%   BMI 31.79 kg/m²   Smoking Status Never   BSA 2.09 m²      Physical Exam  Gen: Alert and oriented ×3 male in no acute distress.  HEENT: Head is normocephalic.  Extraocular muscles are intact.  Tympanic membranes are clear.  Pharynx is clear.  Neck is supple without adenopathy or carotid bruits.  No masses or thyromegaly  Heart: Regular rate and rhythm without murmurs.  Lungs: Clear to auscultation bilaterally.  Abdomen: Soft with normal bowel sounds.  No masses or pain to palpation.  No bruits auscultated.  Extremities: Good range of motion of all joints.  No significant edema. Pedal pulses +1-2/4  Neuro: No signs of focal neurologic deficit.  No tremor.  Speech and hearing are normal.  DTRs +3/4;  Muscle Strength +5/5.  Musculoskeletal: Spine with good ROM.  No scoliosis.  Leg lengths are equal.  Skin: No significant or irregular nevi visualized.  Psych: normal affect.  No suicidal ideation.  Good judgement and insight.       DIAGNOSIS/PLAN    1. Healthcare maintenance    -Weight Management:        Patient encouraged to commit to a diet of lower carbohydrates and increase vegetable and fruit intake. Patient also encouraged to increase water intake to 80 ounces/day. Continue exercise for at least 30 minutes a day on most days of the week.  Sustained obesity leads to increased risk for multiple medical problems including heart attack, stroke, cancer and infection.  For more assistance and weight loss options, go to the website: Yourweightmatters.org.  Additional resources:  RethinkObesity.com, obesity.org, obesityaction.org, [employer].com     -Pt encouraged to dilute Gatorade in order to decrease sugar intake.    2. Encounter for therapeutic drug level monitoring    - Drug Screen, Urine With Reflex to Confirmation; Future  - Amphetamine Confirm, Urine; Future    3. Slowing of urinary stream    - Prostate Specific Antigen; Future  - Urinalysis with Reflex Microscopic; Future    4. Benign  prostatic hyperplasia, unspecified whether lower urinary tract symptoms present    - Prostate Specific Antigen; Future  - Urinalysis with Reflex Microscopic; Future    5. Hypogonadism male    - Testosterone; Future  - TSH with reflex to Free T4 if abnormal; Future    6. Vitamin D deficiency    - CBC; Future  - Vitamin D 25-Hydroxy,Total (for eval of Vitamin D levels); Future    7. Encounter for drug screening    - Drug Screen, Urine With Reflex to Confirmation; Future  - Amphetamine Confirm, Urine; Future    8. Hypertension, unspecified type    - Comprehensive Metabolic Panel; Future  - ECG 12 Lead  -Take Lisinopril as prescribed (improved compliance) to reduce chances of Heart Attack or Stroke    9. Borderline hyperlipidemia    - Lipid Panel; Future    10. Attention deficit hyperactivity disorder (ADHD), predominantly inattentive type    -continue Adderall as prescribed  -At patient request printed Adderall prescriptions.  3 prescriptions were printed today.  He is aware that the prescriptions will not be reprinted if any are lost, stolen or damaged.    11. Colon cancer screening    -Colonoscopy ordered        Follow up in 6 months for medical management    I will continue to monitor, evaluate, assess and treat all problems/diagnoses as appropriate and continue to collaborate with specialists.    Contact office or send a  MY Chart message with any questions or concerns    Encouraged to sign up with my  My Chart  Patient will only be notified of labs that require medical intervention.    Prescriptions will not be filled unless you are compliant with your follow up appointments or have a follow up appointment scheduled as per instruction of your physician. Refills should be requested at the time of your visit.    **Charting was completed using voice recognition technology and may include unintended errors**    Harlan Lynn DO, FACOFP  [address] [facility]  [place] [address] [phone]      Harlan Lynn DO,  FACOFP

## 2024-08-05 ENCOUNTER — APPOINTMENT (OUTPATIENT)
Dept: GASTROENTEROLOGY | Facility: EXTERNAL LOCATION | Age: 49
End: 2024-08-05
Payer: COMMERCIAL

## 2024-08-06 DIAGNOSIS — Z12.11 COLON CANCER SCREENING: ICD-10-CM

## 2024-08-07 RX ORDER — SODIUM, POTASSIUM,MAG SULFATES 17.5-3.13G
SOLUTION, RECONSTITUTED, ORAL ORAL
Qty: 1 EACH | Refills: 0 | Status: SHIPPED | OUTPATIENT
Start: 2024-08-07

## 2024-08-19 ENCOUNTER — APPOINTMENT (OUTPATIENT)
Dept: GASTROENTEROLOGY | Facility: EXTERNAL LOCATION | Age: 49
End: 2024-08-19
Payer: COMMERCIAL

## 2024-08-19 DIAGNOSIS — Z12.11 COLON CANCER SCREENING: ICD-10-CM

## 2024-08-26 ENCOUNTER — APPOINTMENT (OUTPATIENT)
Dept: GASTROENTEROLOGY | Facility: EXTERNAL LOCATION | Age: 49
End: 2024-08-26
Payer: COMMERCIAL

## 2024-09-16 ENCOUNTER — ANESTHESIA EVENT (OUTPATIENT)
Dept: GASTROENTEROLOGY | Facility: EXTERNAL LOCATION | Age: 49
End: 2024-09-16

## 2024-09-23 ENCOUNTER — ANESTHESIA (OUTPATIENT)
Dept: GASTROENTEROLOGY | Facility: EXTERNAL LOCATION | Age: 49
End: 2024-09-23

## 2024-09-23 ENCOUNTER — APPOINTMENT (OUTPATIENT)
Dept: GASTROENTEROLOGY | Facility: EXTERNAL LOCATION | Age: 49
End: 2024-09-23
Payer: COMMERCIAL

## 2024-11-20 DIAGNOSIS — F90.0 ATTENTION DEFICIT HYPERACTIVITY DISORDER (ADHD), PREDOMINANTLY INATTENTIVE TYPE: ICD-10-CM

## 2024-11-20 RX ORDER — DEXTROAMPHETAMINE SACCHARATE, AMPHETAMINE ASPARTATE MONOHYDRATE, DEXTROAMPHETAMINE SULFATE AND AMPHETAMINE SULFATE 3.75; 3.75; 3.75; 3.75 MG/1; MG/1; MG/1; MG/1
15 CAPSULE, EXTENDED RELEASE ORAL 2 TIMES DAILY
Qty: 60 CAPSULE | Refills: 0 | Status: SHIPPED | OUTPATIENT
Start: 2024-12-18 | End: 2025-01-17

## 2024-11-20 RX ORDER — DEXTROAMPHETAMINE SACCHARATE, AMPHETAMINE ASPARTATE MONOHYDRATE, DEXTROAMPHETAMINE SULFATE AND AMPHETAMINE SULFATE 3.75; 3.75; 3.75; 3.75 MG/1; MG/1; MG/1; MG/1
15 CAPSULE, EXTENDED RELEASE ORAL 2 TIMES DAILY
Qty: 60 CAPSULE | Refills: 0 | Status: SHIPPED | OUTPATIENT
Start: 2024-11-20 | End: 2024-12-20

## 2025-01-06 ENCOUNTER — APPOINTMENT (OUTPATIENT)
Dept: PRIMARY CARE | Facility: CLINIC | Age: 50
End: 2025-01-06
Payer: COMMERCIAL

## 2025-01-06 VITALS
SYSTOLIC BLOOD PRESSURE: 120 MMHG | HEIGHT: 67 IN | HEART RATE: 85 BPM | DIASTOLIC BLOOD PRESSURE: 70 MMHG | OXYGEN SATURATION: 97 % | WEIGHT: 208 LBS | BODY MASS INDEX: 32.65 KG/M2

## 2025-01-06 DIAGNOSIS — E66.811 CLASS 1 OBESITY DUE TO EXCESS CALORIES WITH SERIOUS COMORBIDITY AND BODY MASS INDEX (BMI) OF 31.0 TO 31.9 IN ADULT: ICD-10-CM

## 2025-01-06 DIAGNOSIS — Z12.11 COLON CANCER SCREENING: ICD-10-CM

## 2025-01-06 DIAGNOSIS — F90.0 ATTENTION DEFICIT HYPERACTIVITY DISORDER (ADHD), PREDOMINANTLY INATTENTIVE TYPE: ICD-10-CM

## 2025-01-06 DIAGNOSIS — E66.09 CLASS 1 OBESITY DUE TO EXCESS CALORIES WITH SERIOUS COMORBIDITY AND BODY MASS INDEX (BMI) OF 31.0 TO 31.9 IN ADULT: ICD-10-CM

## 2025-01-06 DIAGNOSIS — I10 HYPERTENSION, UNSPECIFIED TYPE: Primary | ICD-10-CM

## 2025-01-06 PROCEDURE — 80053 COMPREHEN METABOLIC PANEL: CPT

## 2025-01-06 PROCEDURE — 3074F SYST BP LT 130 MM HG: CPT | Performed by: FAMILY MEDICINE

## 2025-01-06 PROCEDURE — 3008F BODY MASS INDEX DOCD: CPT | Performed by: FAMILY MEDICINE

## 2025-01-06 PROCEDURE — 99214 OFFICE O/P EST MOD 30 MIN: CPT | Performed by: FAMILY MEDICINE

## 2025-01-06 PROCEDURE — 3078F DIAST BP <80 MM HG: CPT | Performed by: FAMILY MEDICINE

## 2025-01-06 RX ORDER — DEXTROAMPHETAMINE SACCHARATE, AMPHETAMINE ASPARTATE MONOHYDRATE, DEXTROAMPHETAMINE SULFATE AND AMPHETAMINE SULFATE 3.75; 3.75; 3.75; 3.75 MG/1; MG/1; MG/1; MG/1
15 CAPSULE, EXTENDED RELEASE ORAL 2 TIMES DAILY
Qty: 60 CAPSULE | Refills: 0 | Status: SHIPPED | OUTPATIENT
Start: 2025-02-24 | End: 2025-03-26

## 2025-01-06 RX ORDER — DEXTROAMPHETAMINE SACCHARATE, AMPHETAMINE ASPARTATE MONOHYDRATE, DEXTROAMPHETAMINE SULFATE AND AMPHETAMINE SULFATE 3.75; 3.75; 3.75; 3.75 MG/1; MG/1; MG/1; MG/1
15 CAPSULE, EXTENDED RELEASE ORAL 2 TIMES DAILY
Qty: 60 CAPSULE | Refills: 0 | Status: SHIPPED | OUTPATIENT
Start: 2025-03-24 | End: 2025-04-23

## 2025-01-06 RX ORDER — LISINOPRIL 20 MG/1
20 TABLET ORAL DAILY
Qty: 90 TABLET | Refills: 2 | Status: SHIPPED | OUTPATIENT
Start: 2025-01-06

## 2025-01-06 RX ORDER — DEXTROAMPHETAMINE SACCHARATE, AMPHETAMINE ASPARTATE MONOHYDRATE, DEXTROAMPHETAMINE SULFATE AND AMPHETAMINE SULFATE 3.75; 3.75; 3.75; 3.75 MG/1; MG/1; MG/1; MG/1
15 CAPSULE, EXTENDED RELEASE ORAL 2 TIMES DAILY
Qty: 60 CAPSULE | Refills: 0 | Status: SHIPPED | OUTPATIENT
Start: 2025-01-27 | End: 2025-02-26

## 2025-01-06 NOTE — PROGRESS NOTES
General Medical Management Note    49 y.o. male presents for Medical Management  HPI  ADD - requesting printed Rx due to living in [place], works in [place].  Last refill 12/29/24.  Last dose today.  Continues to be very effective.   Much more focused and has the will power to do things needed and complete tasks.      HTN well controlled with current Rx    Colonoscopy - still hasn't done due to poor prep, then covid, then got really busy at work.    Weight - not eating healthy.  Working a lot.  Not exercising.      Past Medical History:   Diagnosis Date    Elevated blood-pressure reading, without diagnosis of hypertension 12/10/2013    Elevated blood pressure reading without diagnosis of hypertension    Fracture of unspecified phalanx of unspecified finger, initial encounter for closed fracture 12/10/2013    Closed fracture of one or more phalanges of hand    Personal history of urinary calculi 12/01/2017    History of renal calculi      Past Surgical History:   Procedure Laterality Date    ANTERIOR CRUCIATE LIGAMENT REPAIR  12/04/2014    Primary Repair Of Knee Ligament Cruciate Anterior     No family history on file.   Social History     Socioeconomic History    Marital status: Single     Spouse name: Not on file    Number of children: Not on file    Years of education: Not on file    Highest education level: Not on file   Occupational History    Not on file   Tobacco Use    Smoking status: Never    Smokeless tobacco: Current     Types: Chew   Substance and Sexual Activity    Alcohol use: Yes     Alcohol/week: 1.0 standard drink of alcohol     Types: 1 Glasses of wine per week    Drug use: Yes     Types: Amphetamines    Sexual activity: Not on file   Other Topics Concern    Not on file   Social History Narrative    Not on file     Social Drivers of Health     Financial Resource Strain: Not on file   Food Insecurity: Not on file   Transportation Needs: Not on file   Physical Activity: Not on file   Stress: Not on  "file   Social Connections: Not on file   Intimate Partner Violence: Not on file   Housing Stability: Not on file       Current Outpatient Medications on File Prior to Visit   Medication Sig Dispense Refill    amphetamine-dextroamphetamine XR (Adderall XR) 15 mg 24 hr capsule Take 1 capsule (15 mg) by mouth 2 times a day. Do not crush or chew. Do not fill before December 18, 2024. 60 capsule 0    lisinopril 20 mg tablet Take 1 tablet (20 mg) by mouth once daily. 90 tablet 2    sodium,potassium,mag sulfates (Suprep) 17.5-3.13-1.6 gram recon soln solution Take one bottle twice as directed by the prep instructions 1 each 0    amphetamine-dextroamphetamine XR (Adderall XR) 15 mg 24 hr capsule Take 1 capsule (15 mg) by mouth 2 times a day. Do not crush or chew. Do not fill before September 16, 2024. 60 capsule 0    amphetamine-dextroamphetamine XR (Adderall XR) 15 mg 24 hr capsule Take 1 capsule (15 mg) by mouth 2 times a day. Do not crush or chew. 60 capsule 0     No current facility-administered medications on file prior to visit.       Allergies   Allergen Reactions    Penicillins Unknown         ROS: Denies chest pain, SOB, Headache, GI problems     Visit Vitals  /70   Pulse 85   Ht 1.702 m (5' 7\")   Wt 94.3 kg (208 lb)   SpO2 97%   BMI 32.58 kg/m²   Smoking Status Never   BSA 2.11 m²      Vitals:    01/06/25 1538   BP: 120/70   Pulse: 85   SpO2: 97%   Weight: 94.3 kg (208 lb)   Height: 1.702 m (5' 7\")       PHYSICAL EXAM:  Alert and oriented x3.  Eyes: EOM grossly intact  Neck supple without lymph adenopathy or carotid bruit.  No masses or thyromegaly  Heart regular rate and rhythm without murmur.  Lungs clear to auscultation.  Legs without edema.  Gait is non-antalgic  Speech clear.  Hearing adequate.          DIAGNOSIS/PLAN:  1. Hypertension, unspecified type (Primary)  - Comprehensive Metabolic Panel; Future  - lisinopril 20 mg tablet; Take 1 tablet (20 mg) by mouth once daily.  Dispense: 90 tablet; Refill: " 2    2. Attention deficit hyperactivity disorder (ADHD), predominantly inattentive type  - amphetamine-dextroamphetamine XR (Adderall XR) 15 mg 24 hr capsule; Take 1 capsule (15 mg) by mouth 2 times a day. Do not crush or chew. Do not fill before January 27, 2025.  Dispense: 60 capsule; Refill: 0  - amphetamine-dextroamphetamine XR (Adderall XR) 15 mg 24 hr capsule; Take 1 capsule (15 mg) by mouth 2 times a day. Do not crush or chew. Do not fill before February 24, 2025.  Dispense: 60 capsule; Refill: 0  - amphetamine-dextroamphetamine XR (Adderall XR) 15 mg 24 hr capsule; Take 1 capsule (15 mg) by mouth 2 times a day. Do not crush or chew. Do not fill before March 24, 2025.  Dispense: 60 capsule; Refill: 0    3. Class 1 obesity due to excess calories with serious comorbidity and body mass index (BMI) of 31.0 to 31.9 in adult  Patient encouraged to commit to a diet of lower carbohydrates and increase vegetable and fruit intake. Patient also encouraged to increase water intake to 80 ounces/day. Continue exercise for at least 30 minutes a day on most days of the week.  Sustained obesity leads to increased risk for multiple medical problems including heart attack, stroke, cancer and infection.  For more assistance and weight loss options, go to the website: Yourweightmatters.org.  Additional resources:  RethinkObesity.com, obesity.org, obesityaction.org, NewAerp.com    4. Colon cancer screening  - Colonoscopy Screening; Average Risk Patient; Future        Return to office in 6 months for comprehensive medical evaluation, long-term medication use monitoring, and preventative services screening    We will continue to monitor, evaluate, assess and treat all problems/diagnoses as appropriate and continue to collaborate with specialists.    Encouraged to sign up with [facility]    Contact office or send a  [employer] message with any questions or concerns    Patient will only be notified of labs that require medical  intervention.    Prescriptions will not be filled unless you are compliant with your follow up appointments or have a follow up appointment scheduled as per instruction of your physician. Refills should be requested at the time of your visit.    **Charting was completed using voice recognition technology and may include unintended errors**    Harlan Lynn DO, ROSA  [id number]  [place] [address]  [phone]  Harlan Lynn DO, ROSA

## 2025-01-07 LAB
ALBUMIN SERPL BCP-MCNC: 4.7 G/DL (ref 3.4–5)
ALP SERPL-CCNC: 60 U/L (ref 33–120)
ALT SERPL W P-5'-P-CCNC: 30 U/L (ref 10–52)
ANION GAP SERPL CALC-SCNC: 10 MMOL/L (ref 10–20)
AST SERPL W P-5'-P-CCNC: 19 U/L (ref 9–39)
BILIRUB SERPL-MCNC: 0.9 MG/DL (ref 0–1.2)
BUN SERPL-MCNC: 20 MG/DL (ref 6–23)
CALCIUM SERPL-MCNC: 10.5 MG/DL (ref 8.6–10.3)
CHLORIDE SERPL-SCNC: 105 MMOL/L (ref 98–107)
CO2 SERPL-SCNC: 31 MMOL/L (ref 21–32)
CREAT SERPL-MCNC: 1.19 MG/DL (ref 0.5–1.3)
EGFRCR SERPLBLD CKD-EPI 2021: 74 ML/MIN/1.73M*2
GLUCOSE SERPL-MCNC: 99 MG/DL (ref 74–99)
POTASSIUM SERPL-SCNC: 4.5 MMOL/L (ref 3.5–5.3)
PROT SERPL-MCNC: 7.7 G/DL (ref 6.4–8.2)
SODIUM SERPL-SCNC: 141 MMOL/L (ref 136–145)

## 2025-01-20 ENCOUNTER — OFFICE VISIT (OUTPATIENT)
Dept: URGENT CARE | Age: 50
End: 2025-01-20
Payer: COMMERCIAL

## 2025-01-20 VITALS
TEMPERATURE: 97.7 F | HEART RATE: 71 BPM | WEIGHT: 200 LBS | OXYGEN SATURATION: 97 % | DIASTOLIC BLOOD PRESSURE: 89 MMHG | BODY MASS INDEX: 30.31 KG/M2 | HEIGHT: 68 IN | RESPIRATION RATE: 20 BRPM | SYSTOLIC BLOOD PRESSURE: 142 MMHG

## 2025-01-20 DIAGNOSIS — J10.1 INFLUENZA A: Primary | ICD-10-CM

## 2025-01-20 LAB
POC RAPID INFLUENZA A: POSITIVE
POC RAPID INFLUENZA B: NEGATIVE
POC SARS-COV-2 AG BINAX: NORMAL

## 2025-01-20 PROCEDURE — 3079F DIAST BP 80-89 MM HG: CPT

## 2025-01-20 PROCEDURE — 99203 OFFICE O/P NEW LOW 30 MIN: CPT

## 2025-01-20 PROCEDURE — 3008F BODY MASS INDEX DOCD: CPT

## 2025-01-20 PROCEDURE — 3077F SYST BP >= 140 MM HG: CPT

## 2025-01-20 PROCEDURE — 87804 INFLUENZA ASSAY W/OPTIC: CPT

## 2025-01-20 PROCEDURE — 87811 SARS-COV-2 COVID19 W/OPTIC: CPT

## 2025-01-20 RX ORDER — OSELTAMIVIR PHOSPHATE 75 MG/1
75 CAPSULE ORAL EVERY 12 HOURS
Qty: 10 CAPSULE | Refills: 0 | Status: SHIPPED | OUTPATIENT
Start: 2025-01-20 | End: 2025-01-25

## 2025-01-20 RX ORDER — BENZONATATE 200 MG/1
200 CAPSULE ORAL 3 TIMES DAILY PRN
Qty: 30 CAPSULE | Refills: 0 | Status: SHIPPED | OUTPATIENT
Start: 2025-01-20 | End: 2025-01-30

## 2025-01-20 ASSESSMENT — ENCOUNTER SYMPTOMS: COUGH: 1

## 2025-01-20 NOTE — PROGRESS NOTES
Subjective   Patient ID: Brad Ngo is a 49 y.o. male. They present today with a chief complaint of Cough (Cough, bodyaches, headache, sore throat, nasal congestion, fever, chills  x 2 days ).    History of Present Illness  Subjective  Brad Ngo is a 49 y.o. male here for evaluation of a cough. The cough is non-productive, without wheezing, dyspnea or hemoptysis, harsh, worsening over time and is aggravated by nothing. Onset of symptoms was 2 days ago, gradually worsening since that time. Associated symptoms include chills, fever, and postnasal drip. Patient does not have a history of asthma. Denies wheezing, shortness of breath, otalgia, sore throat. Patient has not had recent travel. Patient does not have a history of smoking. Patient has not had a previous chest x-ray. @[facility]@     Review of Systems   Constitutional:  Endorses fever, chills, malaise, fatigue  ENT: See HPI  Respiratory:  See HPI    Cardiovascular:  Denies chest pain, palpitations, syncope, lightheadedness, dizziness.    Gastrointestinal:  Denies abdominal pain, nausea, vomiting, diarrhea.    Integumentary:  [place] rash.    All other systems are negative        History provided by:  Patient   used: No    Cough        Past Medical History  Allergies as of 01/20/2025 - Reviewed 01/20/2025   Allergen Reaction Noted    Penicillins Unknown 06/06/2023       (Not in a hospital admission)       Past Medical History:   Diagnosis Date    Elevated blood-pressure reading, without diagnosis of hypertension 12/10/2013    Elevated blood pressure reading without diagnosis of hypertension    Fracture of unspecified phalanx of unspecified finger, initial encounter for closed fracture 12/10/2013    Closed fracture of one or more phalanges of hand    Personal history of urinary calculi 12/01/2017    History of renal calculi       Past Surgical History:   Procedure Laterality Date    ANTERIOR CRUCIATE LIGAMENT REPAIR  12/04/2014    Primary Repair  "Of Knee Ligament Cruciate Anterior        reports that he has never smoked. His smokeless tobacco use includes chew. He reports current alcohol use of about 1.0 standard drink of alcohol per week. He reports current drug use. Drug: Amphetamines.    Review of Systems  Review of Systems   Respiratory:  Positive for cough.                                   Objective    Vitals:    01/20/25 1756   BP: 142/89   BP Location: Right arm   Patient Position: Sitting   BP Cuff Size: Adult   Pulse: 71   Resp: 20   Temp: 36.5 °C (97.7 °F)   TempSrc: Temporal   SpO2: 97%   Weight: 90.7 kg (200 lb)   Height: 1.715 m (5' 7.5\")     No LMP for male patient.    Physical Exam  Vitals and nursing note reviewed.   Constitutional:       General: He is not in acute distress.     Appearance: Normal appearance. He is normal weight. He is ill-appearing. He is not toxic-appearing or diaphoretic.   HENT:      Nose: Congestion and rhinorrhea present.      Right Turbinates: Enlarged and swollen.      Left Turbinates: Enlarged and swollen.      Mouth/Throat:      Mouth: Mucous membranes are moist.      Pharynx: Oropharynx is clear. Postnasal drip present. No pharyngeal swelling, oropharyngeal exudate, posterior oropharyngeal erythema or uvula swelling.      Tonsils: No tonsillar exudate or tonsillar abscesses.   Eyes:      General: No scleral icterus.        Right eye: No discharge.         Left eye: No discharge.      Conjunctiva/sclera: Conjunctivae normal.   Cardiovascular:      Rate and Rhythm: Normal rate and regular rhythm.      Pulses: Normal pulses.      Heart sounds: Normal heart sounds.   Pulmonary:      Effort: Pulmonary effort is normal. No respiratory distress.      Breath sounds: Normal breath sounds. No stridor. No wheezing, rhonchi or rales.   Musculoskeletal:      Cervical back: Normal range of motion and neck supple. No rigidity or tenderness.   Lymphadenopathy:      Cervical: No cervical adenopathy.   Skin:     General: Skin is " warm and dry.      Coloration: Skin is not jaundiced or pale.      Findings: No bruising or erythema.   Neurological:      General: No focal deficit present.      Mental Status: He is alert and oriented to person, place, and time.         Procedures    Point of Care Test & Imaging Results from this visit  Results for orders placed or performed in visit on 01/20/25   POCT Covid-19 Rapid Antigen   Result Value Ref Range    POC VIKY-COV-2 AG  Presumptive negative test for SARS-CoV-2 (no antigen detected)     Presumptive negative test for SARS-CoV-2 (no antigen detected)   POCT Influenza A/B manually resulted   Result Value Ref Range    POC Rapid Influenza A Positive (A) Negative    POC Rapid Influenza B Negative Negative      No results found.    Diagnostic study results (if any) were reviewed by LETHA Iyer.    Assessment/Plan   Allergies, medications, history, and pertinent labs/EKGs/Imaging reviewed by LETHA Iyer.     Medical Decision Making    Based on history and physical exam, findings consistent with influenza.? No evidence of strep, AOM, bacterial sinusitis, pneumonia, or sepsis.? Within the 48-hour window to start Tamiflu.? Prescription sent to start medication today.? Strongly?encouraged patient?to continue symptomatic and supportive care measures. Advised?follow-up with PCP, return with any new or worsening symptoms.? Discussed when to seek emergent care. Patient verbalized understanding and agreeable with plan.    Testing: Influenza - A positive, COVID - negative    Treatment: Tamiflu, benzonatate    Differential: 1) Influenza , 2) COVID , 3) Viral URI    Impression: Impression    We discussed with the patient our clinical thoughts at this time given the above findings and clinical assessment and we had a shared decision-making conversation in a patient-centered decision-making model on how to proceed forward. The patient was instructed on the importance of a close follow-up with  PCP and other care providers. The patient was also advised that an Urgent care diagnosis is often a preliminary impression and that definitive care is often not able to be given completley in the Urgent care setting.     At time of discharge patient was clinically well-appearing and HDS for outpatient management. The patient was educated regarding diagnosis, supportive care, OTC and Rx medications. The patient was given the opportunity to ask questions prior to discharge.  They verbalized understanding of my discussion of the plans for treatment, expected course, indications to return to  or seek further evaluation in ED, and the need for timely follow up as directed.   They were provided with a work/school excuse if requested.         Orders and Diagnoses  Diagnoses and all orders for this visit:  Influenza A  -     POCT Covid-19 Rapid Antigen  -     POCT Influenza A/B manually resulted      Medical Admin Record      Patient disposition: Home    Electronically signed by LETHA Iyer  6:19 PM

## 2025-01-23 ENCOUNTER — OFFICE VISIT (OUTPATIENT)
Dept: URGENT CARE | Age: 50
End: 2025-01-23
Payer: COMMERCIAL

## 2025-01-23 VITALS
TEMPERATURE: 98.9 F | WEIGHT: 200 LBS | SYSTOLIC BLOOD PRESSURE: 152 MMHG | RESPIRATION RATE: 12 BRPM | HEIGHT: 68 IN | DIASTOLIC BLOOD PRESSURE: 90 MMHG | OXYGEN SATURATION: 96 % | HEART RATE: 99 BPM | BODY MASS INDEX: 30.31 KG/M2

## 2025-01-23 DIAGNOSIS — J10.1 INFLUENZA A: Primary | ICD-10-CM

## 2025-01-23 RX ORDER — BROMPHENIRAMINE MALEATE, PSEUDOEPHEDRINE HYDROCHLORIDE, AND DEXTROMETHORPHAN HYDROBROMIDE 2; 30; 10 MG/5ML; MG/5ML; MG/5ML
10 SYRUP ORAL 4 TIMES DAILY PRN
Qty: 240 ML | Refills: 0 | Status: SHIPPED | OUTPATIENT
Start: 2025-01-23 | End: 2025-01-28

## 2025-01-23 RX ORDER — ALBUTEROL SULFATE 90 UG/1
2 INHALANT RESPIRATORY (INHALATION) EVERY 6 HOURS PRN
Qty: 18 G | Refills: 0 | Status: SHIPPED | OUTPATIENT
Start: 2025-01-23 | End: 2026-01-23

## 2025-01-23 NOTE — LETTER
January 23, 2025     Patient: Brad Ngo   YOB: 1975   Date of Visit: 1/23/2025       To Whom It May Concern:    Brad Ngo was seen in my clinic on 1/23/2025 at 6:40 pm. Please excuse Brad for his absence from work 1/24/25 and 1/25/25.    If you have any questions or concerns, please don't hesitate to call.         Sincerely,         Diana Roper MD        CC: No Recipients

## 2025-01-24 NOTE — PATIENT INSTRUCTIONS
Continue Tamiflu as directed  Bromfed as needed  Albuterol as directed  Rest; increase fluids  Follow up with new or worsening symptoms

## 2025-01-24 NOTE — PROGRESS NOTES
Subjective   Patient ID: Brad Ngo is a 49 y.o. male. They present today with a chief complaint of cough. Patient was seen her on 1/20/25, diagnosed with Influenza A, and was prescribed Tamiflu and Benzonatate. He is back today with continued cough. He says that Tamiflu is not helping. Explained to patient that Tamiflu in and of itself does not help with symptoms and that  he needs to take medications for symptomatic relief.    History of Present Illness  HPI    Past Medical History  Allergies as of 01/23/2025 - Reviewed 01/23/2025   Allergen Reaction Noted    Penicillins Unknown 06/06/2023       (Not in a hospital admission)       Past Medical History:   Diagnosis Date    Elevated blood-pressure reading, without diagnosis of hypertension 12/10/2013    Elevated blood pressure reading without diagnosis of hypertension    Fracture of unspecified phalanx of unspecified finger, initial encounter for closed fracture 12/10/2013    Closed fracture of one or more phalanges of hand    Personal history of urinary calculi 12/01/2017    History of renal calculi       Past Surgical History:   Procedure Laterality Date    ANTERIOR CRUCIATE LIGAMENT REPAIR  12/04/2014    Primary Repair Of Knee Ligament Cruciate Anterior        reports that he has never smoked. His smokeless tobacco use includes chew. He reports current alcohol use of about 1.0 standard drink of alcohol per week. He reports current drug use. Drug: Amphetamines.    Review of Systems  Review of Systems                               Objective    There were no vitals filed for this visit.  No LMP for male patient.    Physical Exam  Vitals and nursing note reviewed.   Constitutional:       General: He is not in acute distress.     Appearance: Normal appearance. He is not toxic-appearing.   HENT:      Right Ear: Tympanic membrane, ear canal and external ear normal.      Left Ear: Tympanic membrane, ear canal and external ear normal.      Nose: Rhinorrhea present.       Mouth/Throat:      Mouth: Mucous membranes are moist.      Pharynx: Oropharynx is clear.   Eyes:      Extraocular Movements: Extraocular movements intact.      Conjunctiva/sclera: Conjunctivae normal.      Pupils: Pupils are equal, round, and reactive to light.   Cardiovascular:      Rate and Rhythm: Normal rate and regular rhythm.      Pulses: Normal pulses.      Heart sounds: Normal heart sounds.   Pulmonary:      Effort: Pulmonary effort is normal.      Breath sounds: Rhonchi present. No wheezing or rales.   Musculoskeletal:      Cervical back: Normal range of motion. No rigidity or tenderness.   Lymphadenopathy:      Cervical: No cervical adenopathy.   Neurological:      Mental Status: He is alert.         Procedures    Point of Care Test & Imaging Results from this visit  No results found for this visit on 01/23/25.   No results found.    Diagnostic study results (if any) were reviewed by Diana Roper MD.    Assessment/Plan   Allergies, medications, history, and pertinent labs/EKGs/Imaging reviewed by Diana Roper MD.     Medical Decision Making      Orders and Diagnoses  There are no diagnoses linked to this encounter.    Medical Admin Record      Patient disposition: Home    Electronically signed by Diana Roper MD  7:10 PM

## 2025-02-19 ENCOUNTER — HOSPITAL ENCOUNTER (EMERGENCY)
Facility: HOSPITAL | Age: 50
Discharge: HOME | End: 2025-02-19
Attending: EMERGENCY MEDICINE
Payer: COMMERCIAL

## 2025-02-19 ENCOUNTER — APPOINTMENT (OUTPATIENT)
Dept: RADIOLOGY | Facility: HOSPITAL | Age: 50
End: 2025-02-19
Payer: COMMERCIAL

## 2025-02-19 VITALS
HEART RATE: 83 BPM | SYSTOLIC BLOOD PRESSURE: 126 MMHG | HEIGHT: 67 IN | WEIGHT: 200 LBS | TEMPERATURE: 97.2 F | OXYGEN SATURATION: 96 % | BODY MASS INDEX: 31.39 KG/M2 | DIASTOLIC BLOOD PRESSURE: 83 MMHG | RESPIRATION RATE: 18 BRPM

## 2025-02-19 DIAGNOSIS — K59.00 CONSTIPATION, UNSPECIFIED CONSTIPATION TYPE: ICD-10-CM

## 2025-02-19 DIAGNOSIS — R10.30 LOWER ABDOMINAL PAIN: Primary | ICD-10-CM

## 2025-02-19 LAB
ALBUMIN SERPL BCP-MCNC: 4.2 G/DL (ref 3.4–5)
ALP SERPL-CCNC: 66 U/L (ref 33–120)
ALT SERPL W P-5'-P-CCNC: 45 U/L (ref 10–52)
ANION GAP SERPL CALC-SCNC: 10 MMOL/L (ref 10–20)
APPEARANCE UR: CLEAR
AST SERPL W P-5'-P-CCNC: 19 U/L (ref 9–39)
BASOPHILS # BLD AUTO: 0.04 X10*3/UL (ref 0–0.1)
BASOPHILS # BLD AUTO: NORMAL 10*3/UL
BASOPHILS NFR BLD AUTO: 0.2 %
BASOPHILS NFR BLD AUTO: NORMAL %
BILIRUB SERPL-MCNC: 0.8 MG/DL (ref 0–1.2)
BILIRUB UR STRIP.AUTO-MCNC: NEGATIVE MG/DL
BUN SERPL-MCNC: 27 MG/DL (ref 6–23)
CALCIUM SERPL-MCNC: 10.2 MG/DL (ref 8.6–10.3)
CHLORIDE SERPL-SCNC: 105 MMOL/L (ref 98–107)
CO2 SERPL-SCNC: 25 MMOL/L (ref 21–32)
COLOR UR: YELLOW
CREAT SERPL-MCNC: 1.13 MG/DL (ref 0.5–1.3)
EGFRCR SERPLBLD CKD-EPI 2021: 79 ML/MIN/1.73M*2
EOSINOPHIL # BLD AUTO: 0.02 X10*3/UL (ref 0–0.7)
EOSINOPHIL # BLD AUTO: NORMAL 10*3/UL
EOSINOPHIL NFR BLD AUTO: 0.1 %
EOSINOPHIL NFR BLD AUTO: NORMAL %
ERYTHROCYTE [DISTWIDTH] IN BLOOD BY AUTOMATED COUNT: 12.6 % (ref 11.5–14.5)
ERYTHROCYTE [DISTWIDTH] IN BLOOD BY AUTOMATED COUNT: NORMAL %
GLUCOSE SERPL-MCNC: 161 MG/DL (ref 74–99)
GLUCOSE UR STRIP.AUTO-MCNC: NORMAL MG/DL
HCT VFR BLD AUTO: 47.9 % (ref 41–52)
HCT VFR BLD AUTO: NORMAL %
HGB BLD-MCNC: 17 G/DL (ref 13.5–17.5)
HGB BLD-MCNC: NORMAL G/DL
HOLD SPECIMEN: NORMAL
IMM GRANULOCYTES # BLD AUTO: 0.08 X10*3/UL (ref 0–0.7)
IMM GRANULOCYTES # BLD AUTO: NORMAL 10*3/UL
IMM GRANULOCYTES NFR BLD AUTO: 0.5 % (ref 0–0.9)
IMM GRANULOCYTES NFR BLD AUTO: NORMAL %
KETONES UR STRIP.AUTO-MCNC: NEGATIVE MG/DL
LACTATE SERPL-SCNC: 1.2 MMOL/L (ref 0.4–2)
LEUKOCYTE ESTERASE UR QL STRIP.AUTO: NEGATIVE
LIPASE SERPL-CCNC: 29 U/L (ref 9–82)
LYMPHOCYTES # BLD AUTO: 0.65 X10*3/UL (ref 1.2–4.8)
LYMPHOCYTES # BLD AUTO: NORMAL 10*3/UL
LYMPHOCYTES NFR BLD AUTO: 3.7 %
LYMPHOCYTES NFR BLD AUTO: NORMAL %
MCH RBC QN AUTO: 31.5 PG (ref 26–34)
MCH RBC QN AUTO: NORMAL PG
MCHC RBC AUTO-ENTMCNC: 35.5 G/DL (ref 32–36)
MCHC RBC AUTO-ENTMCNC: NORMAL G/DL
MCV RBC AUTO: 89 FL (ref 80–100)
MCV RBC AUTO: NORMAL FL
MONOCYTES # BLD AUTO: 0.66 X10*3/UL (ref 0.1–1)
MONOCYTES # BLD AUTO: NORMAL 10*3/UL
MONOCYTES NFR BLD AUTO: 3.7 %
MONOCYTES NFR BLD AUTO: NORMAL %
NEUTROPHILS # BLD AUTO: 16.31 X10*3/UL (ref 1.2–7.7)
NEUTROPHILS # BLD AUTO: NORMAL 10*3/UL
NEUTROPHILS NFR BLD AUTO: 91.8 %
NEUTROPHILS NFR BLD AUTO: NORMAL %
NITRITE UR QL STRIP.AUTO: NEGATIVE
NRBC BLD-RTO: 0 /100 WBCS (ref 0–0)
NRBC BLD-RTO: NORMAL /100{WBCS}
PH UR STRIP.AUTO: 5 [PH]
PLATELET # BLD AUTO: 223 X10*3/UL (ref 150–450)
PLATELET # BLD AUTO: NORMAL 10*3/UL
POTASSIUM SERPL-SCNC: 4.1 MMOL/L (ref 3.5–5.3)
PROT SERPL-MCNC: 7.1 G/DL (ref 6.4–8.2)
PROT UR STRIP.AUTO-MCNC: NEGATIVE MG/DL
RBC # BLD AUTO: 5.4 X10*6/UL (ref 4.5–5.9)
RBC # BLD AUTO: NORMAL 10*6/UL
RBC # UR STRIP.AUTO: NEGATIVE MG/DL
SODIUM SERPL-SCNC: 136 MMOL/L (ref 136–145)
SP GR UR STRIP.AUTO: 1.02
UROBILINOGEN UR STRIP.AUTO-MCNC: NORMAL MG/DL
WBC # BLD AUTO: 17.8 X10*3/UL (ref 4.4–11.3)
WBC # BLD AUTO: NORMAL 10*3/UL

## 2025-02-19 PROCEDURE — 96361 HYDRATE IV INFUSION ADD-ON: CPT

## 2025-02-19 PROCEDURE — 85025 COMPLETE CBC W/AUTO DIFF WBC: CPT

## 2025-02-19 PROCEDURE — 81003 URINALYSIS AUTO W/O SCOPE: CPT

## 2025-02-19 PROCEDURE — 74177 CT ABD & PELVIS W/CONTRAST: CPT

## 2025-02-19 PROCEDURE — 2550000001 HC RX 255 CONTRASTS: Performed by: EMERGENCY MEDICINE

## 2025-02-19 PROCEDURE — 96360 HYDRATION IV INFUSION INIT: CPT | Mod: 59

## 2025-02-19 PROCEDURE — 74177 CT ABD & PELVIS W/CONTRAST: CPT | Mod: FOREIGN READ | Performed by: RADIOLOGY

## 2025-02-19 PROCEDURE — 80053 COMPREHEN METABOLIC PANEL: CPT

## 2025-02-19 PROCEDURE — 36415 COLL VENOUS BLD VENIPUNCTURE: CPT

## 2025-02-19 PROCEDURE — 99285 EMERGENCY DEPT VISIT HI MDM: CPT | Mod: 25 | Performed by: EMERGENCY MEDICINE

## 2025-02-19 PROCEDURE — 83605 ASSAY OF LACTIC ACID: CPT

## 2025-02-19 PROCEDURE — 2500000004 HC RX 250 GENERAL PHARMACY W/ HCPCS (ALT 636 FOR OP/ED)

## 2025-02-19 PROCEDURE — 83690 ASSAY OF LIPASE: CPT

## 2025-02-19 RX ORDER — ONDANSETRON 4 MG/1
4 TABLET, ORALLY DISINTEGRATING ORAL EVERY 8 HOURS PRN
Qty: 20 TABLET | Refills: 0 | Status: SHIPPED | OUTPATIENT
Start: 2025-02-19 | End: 2025-02-26

## 2025-02-19 RX ORDER — ONDANSETRON 4 MG/1
4 TABLET, ORALLY DISINTEGRATING ORAL EVERY 8 HOURS PRN
Qty: 20 TABLET | Refills: 0 | Status: SHIPPED | OUTPATIENT
Start: 2025-02-19 | End: 2025-02-19

## 2025-02-19 RX ORDER — DOCUSATE SODIUM 100 MG/1
100 CAPSULE, LIQUID FILLED ORAL 2 TIMES DAILY
Qty: 14 CAPSULE | Refills: 0 | Status: SHIPPED | OUTPATIENT
Start: 2025-02-19 | End: 2025-02-26

## 2025-02-19 RX ORDER — DOCUSATE SODIUM 100 MG/1
100 CAPSULE, LIQUID FILLED ORAL 2 TIMES DAILY
Qty: 14 CAPSULE | Refills: 0 | Status: SHIPPED | OUTPATIENT
Start: 2025-02-19 | End: 2025-02-19

## 2025-02-19 RX ORDER — SYRING-NEEDL,DISP,INSUL,0.3 ML 29 G X1/2"
296 SYRINGE, EMPTY DISPOSABLE MISCELLANEOUS ONCE
Qty: 296 ML | Refills: 0 | Status: SHIPPED | OUTPATIENT
Start: 2025-02-19 | End: 2025-02-19

## 2025-02-19 RX ADMIN — IOHEXOL 75 ML: 350 INJECTION, SOLUTION INTRAVENOUS at 11:53

## 2025-02-19 RX ADMIN — SODIUM CHLORIDE 1000 ML: 9 INJECTION, SOLUTION INTRAVENOUS at 11:14

## 2025-02-19 ASSESSMENT — PAIN DESCRIPTION - FREQUENCY: FREQUENCY: INTERMITTENT

## 2025-02-19 ASSESSMENT — PAIN DESCRIPTION - DESCRIPTORS: DESCRIPTORS: CRAMPING;SHARP

## 2025-02-19 ASSESSMENT — COLUMBIA-SUICIDE SEVERITY RATING SCALE - C-SSRS
1. IN THE PAST MONTH, HAVE YOU WISHED YOU WERE DEAD OR WISHED YOU COULD GO TO SLEEP AND NOT WAKE UP?: NO
2. HAVE YOU ACTUALLY HAD ANY THOUGHTS OF KILLING YOURSELF?: NO
6. HAVE YOU EVER DONE ANYTHING, STARTED TO DO ANYTHING, OR PREPARED TO DO ANYTHING TO END YOUR LIFE?: NO

## 2025-02-19 ASSESSMENT — PAIN - FUNCTIONAL ASSESSMENT
PAIN_FUNCTIONAL_ASSESSMENT: 0-10
PAIN_FUNCTIONAL_ASSESSMENT: 0-10

## 2025-02-19 ASSESSMENT — PAIN DESCRIPTION - ONSET: ONSET: SUDDEN

## 2025-02-19 ASSESSMENT — PAIN DESCRIPTION - PROGRESSION: CLINICAL_PROGRESSION: NOT CHANGED

## 2025-02-19 ASSESSMENT — LIFESTYLE VARIABLES
TOTAL SCORE: 0
HAVE PEOPLE ANNOYED YOU BY CRITICIZING YOUR DRINKING: NO
EVER HAD A DRINK FIRST THING IN THE MORNING TO STEADY YOUR NERVES TO GET RID OF A HANGOVER: NO
HAVE YOU EVER FELT YOU SHOULD CUT DOWN ON YOUR DRINKING: NO
EVER FELT BAD OR GUILTY ABOUT YOUR DRINKING: NO

## 2025-02-19 ASSESSMENT — PAIN DESCRIPTION - ORIENTATION: ORIENTATION: LOWER

## 2025-02-19 ASSESSMENT — PAIN SCALES - GENERAL: PAINLEVEL_OUTOF10: 7

## 2025-02-19 ASSESSMENT — PAIN DESCRIPTION - LOCATION: LOCATION: ABDOMEN

## 2025-02-19 ASSESSMENT — PAIN DESCRIPTION - PAIN TYPE: TYPE: ACUTE PAIN

## 2025-02-19 NOTE — ED PROVIDER NOTES
"HPI   Chief Complaint   Patient presents with    Abdominal Pain     Lower abdominal pain since 7am.       History provided by: Patient    Limitations to history: None    CC: Abdominal pain    HPI: 49-year-old male with a history of hypertension, ADHD, BPH, hyperlipidemia presents to the emergency department to be evaluated for pain.  Patient characterizes the abdominal pain as \"aching \"and localizes to the bilateral lower quadrants.  He denies having the pain currently but the pain is intermittent.  Nothing objective ask the pain better or worse.  Patient also repeats for the last few days he has been dealing with constipation.  Has been taking MiraLAX and Ex-Lax with little to no relief, he is only taken a few doses and does not take these daily.  Patient has had an problem problems with constipation in the past, at 1 point he was scheduled for a colonoscopy but has not had one yet.  He does not follow-up regularly with GI.  He denies any medications that would attribute to this including anticholinergics or opiates.  He is passing gas and he denies history of small bowel obstructions or abdominal surgeries.  Denies chest pain or shortness of breath.  Denies history of heart or lung disease.  Denies nausea vomiting.  Denies urgency, frequency, dysuria.  Denies blood in the urine or stool.  Denies all other systemic symptoms.    ROS: Negative unless mentioned in HPI    Medical Hx: Allergy to penicillin.  Immunizations up-to-date.    Physical exam:    Constitutional: Patient is well-nourished and well-developed.  Sitting comfortably in the room and in no distress.  Oriented to person, place, time, and situation.    HEENT: Head is normocephalic, atraumatic. Patient's airway is patent.  Tympanic membranes are clear bilaterally.  Nasal mucosa clear.  Mouth with normal mucosa.  Throat is not erythematous and there are no oropharyngeal exudates, uvula is midline.  No obvious facial deformities.    Eyes: Clear bilaterally. "  Pupils are equal round and reactive to light and accommodation.  Extraocular movements intact.      Cardiac: Regular rate, regular rhythm.  Heart sounds S1, S2.  No murmurs, rubs, or gallops.  PMI nondisplaced.  No JVD.    Respiratory: Regular respiratory rate and effort.  Breath sounds are clear and equal bilaterally, no adventitious lung sounds.  Patient is speaking in full sentences and is in no apparent respiratory distress. No use of accessory muscles.      Gastrointestinal: Abdomen is soft, nondistended, and nontender.  There are no obvious deformities.  No rebound tenderness or guarding.  Bowel sounds are normal active.    Genitourinary: No CVA or flank tenderness.    Musculoskeletal: No reproducible tenderness.  No obvious skin or bony deformities.  Patient has equal range of motion in all extremities and no strength deficiencies.  No muscle or joint tenderness. No back or neck tenderness.  Capillary refill less than 3 seconds.  Strong peripheral pulses.  No sensory deficits.    Neurological: Patient is alert and oriented.  No focal deficits.  5/5 strength in all extremities.  Cranial nerves II through XII intact. GCS15.     Skin: Skin is normal color for race and is warm, dry, and intact.  No evidence of trauma.  No lesions, rashes, bruising, jaundice, or masses.    Psych: Appropriate mood and affect.  No apparent risk to self or others.    Heme/lymph: No adenopathy, lymphadenopathy, or splenomegaly    Physical exam is otherwise negative unless stated above or in history of present illness.              Patient History   Past Medical History:   Diagnosis Date    Elevated blood-pressure reading, without diagnosis of hypertension 12/10/2013    Elevated blood pressure reading without diagnosis of hypertension    Fracture of unspecified phalanx of unspecified finger, initial encounter for closed fracture 12/10/2013    Closed fracture of one or more phalanges of hand    Personal history of urinary calculi  12/01/2017    History of renal calculi     Past Surgical History:   Procedure Laterality Date    ANTERIOR CRUCIATE LIGAMENT REPAIR  12/04/2014    Primary Repair Of Knee Ligament Cruciate Anterior     No family history on file.  Social History     Tobacco Use    Smoking status: Never    Smokeless tobacco: Current     Types: Chew   Substance Use Topics    Alcohol use: Yes     Alcohol/week: 1.0 standard drink of alcohol     Types: 1 Glasses of wine per week    Drug use: Yes     Types: Amphetamines       Physical Exam   ED Triage Vitals [02/19/25 1021]   Temperature Heart Rate Respirations BP   36.2 °C (97.2 °F) 86 20 138/83      Pulse Ox Temp Source Heart Rate Source Patient Position   98 % Temporal Monitor Sitting      BP Location FiO2 (%)     Right arm --       Physical Exam      ED Course & MDM   Diagnoses as of 02/19/25 1333   Lower abdominal pain   Constipation, unspecified constipation type     Patient updated on plan for lab testing, IV insertion, radiology imaging, and medications to be administered while in the ER (if indicated). Patient updated on expected wait times for testing and results. Patient provided my name and told to ask any staff member for questions or concerns if they should arise. Electronic medical record reviewed.     MDM    Upon initial assessment, patient was healthy non-toxic appearing and in no apparent distress.     Patient presented to the emergency department with the chief complaint abdominal pain and constipation.  Examination of the patient's heart and lungs are unremarkable.  On arrival to the emergency department, vital signs were within normal limits    Will obtain basic blood work, lactate, urinalysis, CT abdomen and pelvis.  Will give the patient IV normal saline.    CBC reveals a leukocytosis with a left shift neutrophil count.  Lactate is 1.2.  Lipase is 29.  CMP reveals no acute abnormalities other than hyperglycemia 161 without signs of DKA.  Urinalysis is unremarkable.  CT  reveals constipation but otherwise is unremarkable.  I discussed differential with the patient.  He feels well and feels comfortable going home.  He will be discharged with Colace to take daily with the senna and MiraLAX.  I discussed supportive gym including drinking plenty of fluids and resting.  I will also send him with mag citrate as needed.  I discussed increasing his daily fiber and I will have registration set him up with an appointment to follow-up with outpatient GI.  All questions and concerns addressed.  Reasons to return to ER discussed.  Patient verbalized understanding and agreement with the treatment plan and they remained hemodynamically stable in the ER.    This note was dictated using a speech recognition program.  While an attempt was made at proof-reading to minimize errors, minor errors in transcription may be present            No data recorded     [place] Coma Scale Score: 15 (02/19/25 1041 : Kandis Rojo RN)                           Medical Decision Making          Shared ROCIO Attestation:    I personally saw the patient and made/approved the management plan and take responsibility for the patient management.     History: 49-year-old male presents with abdominal pain.    Exam: Regular rate and rhythm cardiac exam with clear breath sounds bilaterally.  Abdomen is soft with mild tenderness to palpation across the lower abdomen.  No rebound or guarding.  Neurological exam is grossly intact.    MDM: Bowel obstruction, colitis    Labs Reviewed   COMPREHENSIVE METABOLIC PANEL - Abnormal       Result Value    Glucose 161 (*)     Sodium 136      Potassium 4.1      Chloride 105      Bicarbonate 25      Anion Gap 10      Urea Nitrogen 27 (*)     Creatinine 1.13      eGFR 79      Calcium 10.2      Albumin 4.2      Alkaline Phosphatase 66      Total Protein 7.1      AST 19      Bilirubin, Total 0.8      ALT 45     CBC WITH AUTO DIFFERENTIAL - Abnormal    WBC 17.8 (*)     nRBC 0.0      RBC 5.40       Hemoglobin 17.0      Hematocrit 47.9      MCV 89      MCH 31.5      MCHC 35.5      RDW 12.6      Platelets 223      Neutrophils % 91.8      Immature Granulocytes %, Automated 0.5      Lymphocytes % 3.7      Monocytes % 3.7      Eosinophils % 0.1      Basophils % 0.2      Neutrophils Absolute 16.31 (*)     Immature Granulocytes Absolute, Automated 0.08      Lymphocytes Absolute 0.65 (*)     Monocytes Absolute 0.66      Eosinophils Absolute 0.02      Basophils Absolute 0.04     LIPASE - Normal    Lipase 29      Narrative:     Venipuncture immediately after or during the administration of Metamizole may lead to falsely low results. Testing should be performed immediately prior to Metamizole dosing.   LACTATE - Normal    Lactate 1.2      Narrative:     Venipuncture immediately after or during the administration of Metamizole may lead to falsely low results. Testing should be performed immediately prior to Metamizole dosing.   URINALYSIS WITH REFLEX CULTURE AND MICROSCOPIC - Normal    Color, Urine Yellow      Appearance, Urine Clear      Specific Gravity, Urine 1.022      pH, Urine 5.0      Protein, Urine NEGATIVE      Glucose, Urine Normal      Blood, Urine NEGATIVE      Ketones, Urine NEGATIVE      Bilirubin, Urine NEGATIVE      Urobilinogen, Urine Normal      Nitrite, Urine NEGATIVE      Leukocyte Esterase, Urine NEGATIVE     CBC WITH AUTO DIFFERENTIAL    WBC        nRBC        RBC        Hemoglobin        Hematocrit        MCV        MCH        MCHC        RDW        Platelets        Neutrophils %        Immature Granulocytes %, Automated        Lymphocytes %        Monocytes %        Eosinophils %        Basophils %        Neutrophils Absolute        Immature Granulocytes Absolute, Automated        Lymphocytes Absolute        Monocytes Absolute        Eosinophils Absolute        Basophils Absolute       URINALYSIS WITH REFLEX CULTURE AND MICROSCOPIC    Narrative:     The following orders were created for panel order  Urinalysis with Reflex Culture and Microscopic.  Procedure                               Abnormality         Status                     ---------                               -----------         ------                     Urinalysis with Reflex C...[[id number]]  Normal              Final result               Extra Urine Gray Tube[[id number]]                            In process                   Please view results for these tests on the individual orders.   EXTRA URINE GRAY TUBE       CT abdomen pelvis w IV contrast   Final Result   Bilateral L3 transverse process fractures and right L4 transverse   process fracture.   Negative for appendicitis.   Constipation without bowel obstruction.   No renal stones or hydronephrosis..   Signed by MD Luis More MD      Procedure  Procedures     Michel Knight PA-C  02/19/25 1335       Michel Knight PA-C  02/19/25 1331

## 2025-03-19 DIAGNOSIS — F90.0 ATTENTION DEFICIT HYPERACTIVITY DISORDER (ADHD), PREDOMINANTLY INATTENTIVE TYPE: ICD-10-CM

## 2025-03-19 RX ORDER — DEXTROAMPHETAMINE SACCHARATE, AMPHETAMINE ASPARTATE MONOHYDRATE, DEXTROAMPHETAMINE SULFATE AND AMPHETAMINE SULFATE 3.75; 3.75; 3.75; 3.75 MG/1; MG/1; MG/1; MG/1
15 CAPSULE, EXTENDED RELEASE ORAL 2 TIMES DAILY
Qty: 60 CAPSULE | Refills: 0 | Status: SHIPPED | OUTPATIENT
Start: 2025-03-21 | End: 2025-04-20

## 2025-03-19 RX ORDER — DEXTROAMPHETAMINE SACCHARATE, AMPHETAMINE ASPARTATE MONOHYDRATE, DEXTROAMPHETAMINE SULFATE AND AMPHETAMINE SULFATE 3.75; 3.75; 3.75; 3.75 MG/1; MG/1; MG/1; MG/1
15 CAPSULE, EXTENDED RELEASE ORAL 2 TIMES DAILY
Qty: 60 CAPSULE | Refills: 0 | Status: SHIPPED | OUTPATIENT
Start: 2025-04-18 | End: 2025-05-18

## 2025-03-19 RX ORDER — DEXTROAMPHETAMINE SACCHARATE, AMPHETAMINE ASPARTATE MONOHYDRATE, DEXTROAMPHETAMINE SULFATE AND AMPHETAMINE SULFATE 3.75; 3.75; 3.75; 3.75 MG/1; MG/1; MG/1; MG/1
15 CAPSULE, EXTENDED RELEASE ORAL 2 TIMES DAILY
Qty: 60 CAPSULE | Refills: 0 | Status: SHIPPED | OUTPATIENT
Start: 2025-05-16 | End: 2025-06-15

## 2025-04-30 ENCOUNTER — APPOINTMENT (OUTPATIENT)
Dept: GASTROENTEROLOGY | Facility: CLINIC | Age: 50
End: 2025-04-30
Payer: COMMERCIAL

## 2025-06-26 ENCOUNTER — OFFICE VISIT (OUTPATIENT)
Dept: GASTROENTEROLOGY | Facility: CLINIC | Age: 50
End: 2025-06-26
Payer: COMMERCIAL

## 2025-06-26 VITALS
SYSTOLIC BLOOD PRESSURE: 138 MMHG | HEART RATE: 89 BPM | BODY MASS INDEX: 32.73 KG/M2 | TEMPERATURE: 98.2 F | WEIGHT: 209 LBS | DIASTOLIC BLOOD PRESSURE: 97 MMHG

## 2025-06-26 DIAGNOSIS — K59.09 CHRONIC CONSTIPATION: Primary | ICD-10-CM

## 2025-06-26 DIAGNOSIS — R10.30 LOWER ABDOMINAL PAIN: ICD-10-CM

## 2025-06-26 PROCEDURE — 99204 OFFICE O/P NEW MOD 45 MIN: CPT | Performed by: NURSE PRACTITIONER

## 2025-06-26 PROCEDURE — 3075F SYST BP GE 130 - 139MM HG: CPT | Performed by: NURSE PRACTITIONER

## 2025-06-26 PROCEDURE — 99202 OFFICE O/P NEW SF 15 MIN: CPT | Performed by: NURSE PRACTITIONER

## 2025-06-26 PROCEDURE — 3080F DIAST BP >= 90 MM HG: CPT | Performed by: NURSE PRACTITIONER

## 2025-06-26 RX ORDER — POLYETHYLENE GLYCOL 3350 17 G/17G
17 POWDER, FOR SOLUTION ORAL DAILY PRN
Qty: 30 PACKET | Refills: 2 | Status: SHIPPED | OUTPATIENT
Start: 2025-06-26 | End: 2025-09-24

## 2025-06-26 RX ORDER — WHEAT DEXTRIN 5 G/7.4 G
POWDER (GRAM) ORAL
Qty: 248 G | Refills: 1 | Status: SHIPPED | OUTPATIENT
Start: 2025-06-26

## 2025-06-26 ASSESSMENT — ENCOUNTER SYMPTOMS
FEVER: 0
CHEST TIGHTNESS: 0
CARDIOVASCULAR NEGATIVE: 1
CHILLS: 0
ENDOCRINE NEGATIVE: 1
ALLERGIC/IMMUNOLOGIC NEGATIVE: 1
WHEEZING: 0
STRIDOR: 0
HEMATOLOGIC/LYMPHATIC NEGATIVE: 1
EYES NEGATIVE: 1
MUSCULOSKELETAL NEGATIVE: 1
PSYCHIATRIC NEGATIVE: 1
RESPIRATORY NEGATIVE: 1
NEUROLOGICAL NEGATIVE: 1
COUGH: 0
DIAPHORESIS: 0
FATIGUE: 0
APNEA: 0
ROS GI COMMENTS: SEE HPI
DIFFICULTY URINATING: 0
SHORTNESS OF BREATH: 0

## 2025-06-26 ASSESSMENT — PAIN SCALES - GENERAL: PAINLEVEL_OUTOF10: 0-NO PAIN

## 2025-06-26 NOTE — PATIENT INSTRUCTIONS
Start benefiber one teaspoon daily with a full glass of water  Start Miralax 1-2 times/day as needed for constipation    Constipation refers to a change in bowel habits, but it has varied meanings. Stools may be too hard or too small, difficult to pass, or infrequent (less than three times per week). People with constipation may also notice a frequent need to strain and a sense that the bowels are not empty.    Your bowels like consistency and routine.     Behavior changes -- The bowels are most active following meals, and this is often the time when stools will pass most readily. If you ignore your body's signals to have a bowel movement, the signals become weaker and weaker over time.    By paying close attention to these signals, you may have an easier time moving your bowels. Drinking a caffeine-containing beverage in the morning may also be helpful.    Increase fiber -- Increasing fiber in your diet may reduce or eliminate constipation. The recommended amount of dietary fiber is 20 to 35 grams of fiber per day. Examples of high fiber foods include pears, spinach, apples, raspberries.     Fiber side effects -- Consuming large amounts of fiber can cause abdominal bloating or gas; this can be minimized by starting with a small amount and slowly increasing until stools become softer and more frequent.    More recently, kiwi fruit has been identified as helping with constipation. It is recommended to eat two daily.    Whatever you decide to use, please try daily for 2 weeks to notice improvement.     If you continue to have constipation despite trying these methods, please schedule a follow-up appointment.         You will be scheduled for a colonoscopy.  Please read all of the instructions 7 days before your colonoscopy.    You will need to take ONLY clear liquids the ENTIRE day before your procedure. These include (clear fruit juices, soda, Gatorade, broth, jello and coffee/tea) Avoid red and purple drinks. No  cream or milk in the coffee.  You will need to take a bowel preparation.  You will also need a .      To schedule a procedure please call [phone]

## 2025-06-26 NOTE — PROGRESS NOTES
Subjective   Patient ID: Brad Ngo is a 49 y.o. male who presents for Abdominal Pain and Diarrhea.]    Seen in the ED on 2/2025 for lower abdominal pain  This comes and goes   When he gets the pain he will get diarrhea  Had a CT for this and it showed constipation  He has not had a colonoscopy  Worse over the last year    He has a BM every few days, he denies rectal bleeding, vomiting, weight loss, night sweats, fevers      Family Hx: Denies a family hx of CRC, GI cancers        Review of Systems   Constitutional:  Negative for chills, diaphoresis, fatigue and fever.   HENT: Negative.     Eyes: Negative.    Respiratory: Negative.  Negative for apnea, cough, chest tightness, shortness of breath, wheezing and stridor.    Cardiovascular: Negative.    Gastrointestinal:         See HPI    Endocrine: Negative.    Genitourinary: Negative.  Negative for difficulty urinating.   Musculoskeletal: Negative.    Skin: Negative.    Allergic/Immunologic: Negative.    Neurological: Negative.    Hematological: Negative.    Psychiatric/Behavioral: Negative.         Objective   Physical Exam  Constitutional:       Appearance: Normal appearance.   HENT:      Nose: Nose normal.   Eyes:      General: Lids are normal.   Cardiovascular:      Rate and Rhythm: Normal rate and regular rhythm.      Heart sounds: Normal heart sounds.   Pulmonary:      Effort: Pulmonary effort is normal.      Breath sounds: Normal breath sounds.   Abdominal:      General: Bowel sounds are normal.   Musculoskeletal:         General: Normal range of motion.   Skin:     General: Skin is warm and dry.   Neurological:      Mental Status: He is alert and oriented to person, place, and time.   Psychiatric:         Mood and Affect: Mood normal.         Assessment/Plan   Diagnoses and all orders for this visit:  Chronic constipation  -     Colonoscopy Screening; Average Risk Patient; Future  Lower abdominal pain    -     Colonoscopy Screening; Average Risk Patient;  Future     49 year old male with PMH of HLD, HTN, obesity, ADHD who presents today for lower abdominal pain w/episodes of diarrhea. He has a BM every few day and episodes of diarrhea with cramping. He has tried to complete a colonoscopy in the past however the 1 day bowel prep was not effective.     - suspect constipation (overflow diarrhea) given h/o prior bowel prep not being effective and CT showing constipation  - complete colonoscopy w/2 day bowel prep  - start benefiber daily and Miralax 1-2 times/day for constipation  - f/up after above     CORINA Gonzales-CNP 06/26/25 2:43 PM

## 2025-06-30 DIAGNOSIS — F90.0 ATTENTION DEFICIT HYPERACTIVITY DISORDER (ADHD), PREDOMINANTLY INATTENTIVE TYPE: ICD-10-CM

## 2025-06-30 RX ORDER — DEXTROAMPHETAMINE SACCHARATE, AMPHETAMINE ASPARTATE MONOHYDRATE, DEXTROAMPHETAMINE SULFATE AND AMPHETAMINE SULFATE 3.75; 3.75; 3.75; 3.75 MG/1; MG/1; MG/1; MG/1
15 CAPSULE, EXTENDED RELEASE ORAL 2 TIMES DAILY
Qty: 60 CAPSULE | Refills: 0 | Status: SHIPPED | OUTPATIENT
Start: 2025-06-30 | End: 2025-07-30

## 2025-07-30 ENCOUNTER — APPOINTMENT (OUTPATIENT)
Dept: PRIMARY CARE | Facility: CLINIC | Age: 50
End: 2025-07-30
Payer: COMMERCIAL

## 2025-07-30 VITALS
WEIGHT: 206.2 LBS | DIASTOLIC BLOOD PRESSURE: 90 MMHG | HEART RATE: 92 BPM | HEIGHT: 67 IN | OXYGEN SATURATION: 97 % | SYSTOLIC BLOOD PRESSURE: 140 MMHG | BODY MASS INDEX: 32.36 KG/M2

## 2025-07-30 DIAGNOSIS — F90.0 ATTENTION DEFICIT HYPERACTIVITY DISORDER (ADHD), PREDOMINANTLY INATTENTIVE TYPE: ICD-10-CM

## 2025-07-30 DIAGNOSIS — E66.811 CLASS 1 OBESITY DUE TO EXCESS CALORIES WITH SERIOUS COMORBIDITY AND BODY MASS INDEX (BMI) OF 31.0 TO 31.9 IN ADULT: ICD-10-CM

## 2025-07-30 DIAGNOSIS — Z79.899 LONG TERM USE OF DRUG: ICD-10-CM

## 2025-07-30 DIAGNOSIS — E55.9 VITAMIN D DEFICIENCY: ICD-10-CM

## 2025-07-30 DIAGNOSIS — Z00.00 HEALTHCARE MAINTENANCE: Primary | ICD-10-CM

## 2025-07-30 DIAGNOSIS — E66.09 CLASS 1 OBESITY DUE TO EXCESS CALORIES WITH SERIOUS COMORBIDITY AND BODY MASS INDEX (BMI) OF 31.0 TO 31.9 IN ADULT: ICD-10-CM

## 2025-07-30 DIAGNOSIS — I10 HYPERTENSION, UNSPECIFIED TYPE: ICD-10-CM

## 2025-07-30 PROCEDURE — 3080F DIAST BP >= 90 MM HG: CPT | Performed by: FAMILY MEDICINE

## 2025-07-30 PROCEDURE — 93000 ELECTROCARDIOGRAM COMPLETE: CPT | Performed by: FAMILY MEDICINE

## 2025-07-30 PROCEDURE — 3077F SYST BP >= 140 MM HG: CPT | Performed by: FAMILY MEDICINE

## 2025-07-30 PROCEDURE — 99396 PREV VISIT EST AGE 40-64: CPT | Performed by: FAMILY MEDICINE

## 2025-07-30 PROCEDURE — 3008F BODY MASS INDEX DOCD: CPT | Performed by: FAMILY MEDICINE

## 2025-07-30 RX ORDER — DEXTROAMPHETAMINE SACCHARATE, AMPHETAMINE ASPARTATE MONOHYDRATE, DEXTROAMPHETAMINE SULFATE AND AMPHETAMINE SULFATE 3.75; 3.75; 3.75; 3.75 MG/1; MG/1; MG/1; MG/1
15 CAPSULE, EXTENDED RELEASE ORAL 2 TIMES DAILY
Qty: 60 CAPSULE | Refills: 0 | Status: SHIPPED | OUTPATIENT
Start: 2025-08-02 | End: 2025-09-01

## 2025-07-30 RX ORDER — DEXTROAMPHETAMINE SACCHARATE, AMPHETAMINE ASPARTATE MONOHYDRATE, DEXTROAMPHETAMINE SULFATE AND AMPHETAMINE SULFATE 3.75; 3.75; 3.75; 3.75 MG/1; MG/1; MG/1; MG/1
15 CAPSULE, EXTENDED RELEASE ORAL 2 TIMES DAILY
Qty: 60 CAPSULE | Refills: 0 | Status: SHIPPED | OUTPATIENT
Start: 2025-09-27 | End: 2025-10-27

## 2025-07-30 RX ORDER — LISINOPRIL 20 MG/1
20 TABLET ORAL DAILY
Qty: 90 TABLET | Refills: 2 | Status: SHIPPED | OUTPATIENT
Start: 2025-07-30

## 2025-07-30 RX ORDER — DEXTROAMPHETAMINE SACCHARATE, AMPHETAMINE ASPARTATE MONOHYDRATE, DEXTROAMPHETAMINE SULFATE AND AMPHETAMINE SULFATE 3.75; 3.75; 3.75; 3.75 MG/1; MG/1; MG/1; MG/1
15 CAPSULE, EXTENDED RELEASE ORAL 2 TIMES DAILY
Qty: 60 CAPSULE | Refills: 0 | Status: SHIPPED | OUTPATIENT
Start: 2025-08-30 | End: 2025-09-29

## 2025-07-30 ASSESSMENT — PATIENT HEALTH QUESTIONNAIRE - PHQ9
1. LITTLE INTEREST OR PLEASURE IN DOING THINGS: NOT AT ALL
SUM OF ALL RESPONSES TO PHQ9 QUESTIONS 1 AND 2: 0
2. FEELING DOWN, DEPRESSED OR HOPELESS: NOT AT ALL

## 2025-07-30 NOTE — PROGRESS NOTES
Annual Comprehensive Medical Exam    49 y.o. male presents for annual comprehensive medical evaluation and preventive services screening.  No recent hospitalizations, surgeries or significant injuries.    HPI  ADD: Using Adderall XR 15 mg twice daily.  Last refill on 7/5/2025.  Last dose today.  Denies palpitations, tinnitus and insomnia.  Blood pressure elevated today because patient has been out of medication for approximately 3 weeks.    Hypertension:  out of med past 3 wks.  Patient states his blood pressure is always less than 130/80 when taking medication regularly.    Fell on ice in Jan 2025.  Fractured 3 transverse processes.  Resolved now.     Pulled something in right groin several months ago.  Slightly enlarged.  Pain is intermittent.  Not every day.  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  Lifting, stooping, reaching all day long.  Denies a bulge that is easily reduced.    Colon cancer screening has been difficult to complete either due to incomplete preparation, COVID infection and work schedule.    IBS with constipation: Patient states he has a bowel movement approximately every 3 days.  Gastroenterologist has given him Benefiber and MiraLAX to use for 1 week or more prior to his colonoscopy.    Medical History[1]   Surgical History[2]  Family History[3]   Social History     Socioeconomic History    Marital status: Single     Spouse name: Not on file    Number of children: Not on file    Years of education: Not on file    Highest education level: Not on file   Occupational History    Not on file   Tobacco Use    Smoking status: Never    Smokeless tobacco: Current     Types: Chew   Substance and Sexual Activity    Alcohol use: Yes     Alcohol/week: 1.0 standard drink of alcohol     Types: 1 Glasses of wine per week    Drug use: Yes     Types: Amphetamines    Sexual activity: Not on file   Other Topics Concern    Not on file   Social History Narrative    Not on file     Social Drivers of Health  "    Financial Resource Strain: Not on file   Food Insecurity: Not on file   Transportation Needs: Not on file   Physical Activity: Not on file   Stress: Not on file   Social Connections: Not on file   Intimate Partner Violence: Not on file   Housing Stability: Not on file       Medications Ordered Prior to Encounter[4]    Allergies[5]      Review of Systems:  Complete review of systems is negative today except for that mentioned in the history of present illness.  In particular patient denies chest pain, shortness of breath, headaches and GI disturbances.      Visit Vitals  /90   Pulse 92   Ht 1.689 m (5' 6.5\")   Wt 93.5 kg (206 lb 3.2 oz)   SpO2 97%   BMI 32.78 kg/m²   Smoking Status Never   BSA 2.09 m²      Vitals:    07/30/25 1600 07/30/25 1627   BP: (!) 140/95 140/90   BP Location: Right arm    Patient Position: Sitting    Pulse: 92    SpO2: 97%    Weight: 93.5 kg (206 lb 3.2 oz)    Height: 1.689 m (5' 6.5\")      Physical Exam  Gen: Alert and oriented ×3 male in no acute distress.  HEENT: Head is normocephalic.  Extraocular muscles are intact.  Tympanic membranes are clear.  Pharynx is clear.  Neck is supple without adenopathy or carotid bruits.  No masses or thyromegaly  Heart: Regular rate and rhythm without murmurs.  Lungs: Clear to auscultation bilaterally.  Abdomen: Soft with normal bowel sounds.  No masses or pain to palpation.  No bruits auscultated.  Extremities: Good range of motion of all joints.  No significant edema. Pedal pulses +1-2/4  Neuro: No signs of focal neurologic deficit.  No tremor.  Speech and hearing are normal.  DTRs +3/4;  Muscle Strength +5/5.  Musculoskeletal: Spine with good ROM.  No scoliosis.  Leg lengths are equal.  Skin: No significant or irregular nevi visualized.  Psych: normal affect.  No suicidal ideation.  Good judgement and insight.       DIAGNOSIS/PLAN    1. Healthcare maintenance (Primary)  - Comprehensive metabolic panel; Future  - CBC; Future  - Lipid panel; " Future  - TSH with reflex to Free T4 if abnormal; Future  - Testosterone; Future  - Prostate Spec.Ag,Screen; Future  - Urinalysis with Reflex Microscopic; Future  - Drug Screen, Urine With Reflex to Confirmation; Future  - Amphetamine Confirm, Urine; Future  - Comprehensive metabolic panel  - CBC  - Lipid panel  - TSH with reflex to Free T4 if abnormal  - Testosterone  - Prostate Spec.Ag,Screen  - Urinalysis with Reflex Microscopic  - Drug Screen, Urine With Reflex to Confirmation  - Amphetamine Confirm, Urine  - ECG 12 lead (Clinic Performed)    2. Attention deficit hyperactivity disorder (ADHD), predominantly inattentive type  - amphetamine-dextroamphetamine XR (Adderall XR) 15 mg 24 hr capsule; Take 1 capsule (15 mg) by mouth 2 times a day. Do not crush or chew. Do not fill before August 2, 2025.  Dispense: 60 capsule; Refill: 0  - amphetamine-dextroamphetamine XR (Adderall XR) 15 mg 24 hr capsule; Take 1 capsule (15 mg) by mouth 2 times a day. Do not crush or chew. Do not fill before August 30, 2025.  Dispense: 60 capsule; Refill: 0  - amphetamine-dextroamphetamine XR (Adderall XR) 15 mg 24 hr capsule; Take 1 capsule (15 mg) by mouth 2 times a day. Do not crush or chew. Do not fill before September 27, 2025.  Dispense: 60 capsule; Refill: 0    3. Hypertension, unspecified type  -Has been out of medication for approximately 3 weeks.  Will send [employer] message with blood pressure readings once he has been taking the medication for at least 1 week.  -Hypertension: Discussed importance of good blood pressure control to avoid long-term complications such as heart attack and stroke.  Patient is aware that blood pressure goal is less than 130/80.  Maintaining a regular exercise program and body mass index (BMI) less than 25 as well as a diet lower in carbohydrates will help reach these goals.  - lisinopril 20 mg tablet; Take 1 tablet (20 mg) by mouth once daily.  Dispense: 90 tablet; Refill: 2    4. Vitamin D  deficiency  - Vitamin D 25-Hydroxy,Total (for eval of Vitamin D levels); Future  - Vitamin D 25-Hydroxy,Total (for eval of Vitamin D levels)    5. Class 1 obesity due to excess calories with serious comorbidity and body mass index (BMI) of 31.0 to 31.9 in adult  Patient encouraged to commit to a diet of lower carbohydrates and increase vegetable and fruit intake. Patient also encouraged to increase water intake to 80 ounces/day. Continue exercise for at least 30 minutes a day on most days of the week.  Sustained obesity leads to increased risk for multiple medical problems including heart attack, stroke, cancer and infection.  For more assistance and weight loss options, go to the website: Yourweightmatters.org.  Additional resources:  RethinkObesity.com, obesity.org, obesityaction.org, TheRanking.comp.com    6. Long term use of drug  - Drug Screen, Urine With Reflex to Confirmation; Future  - Amphetamine Confirm, Urine; Future  - Drug Screen, Urine With Reflex to Confirmation  - Amphetamine Confirm, Urine      Follow up in 6 months for medical management    I will continue to monitor, evaluate, assess and treat all problems/diagnoses as appropriate and continue to collaborate with specialists.    Contact office or send a  MY Chart message with any questions or concerns    Encouraged to sign up with my  My Chart  Patient will only be notified of labs that require medical intervention.    Prescriptions will not be filled unless you are compliant with your follow up appointments or have a follow up appointment scheduled as per instruction of your physician. Refills should be requested at the time of your visit.    **Charting was completed using voice recognition technology and may include unintended errors**    Harlan Lynn DO, ROSA  [id number] [id number]  [place] [address] [phone]    Harlan Lynn DO, FACOFP           [1]   Past Medical History:  Diagnosis Date    Elevated blood-pressure reading, without  diagnosis of hypertension 12/10/2013    Elevated blood pressure reading without diagnosis of hypertension    Fracture of unspecified phalanx of unspecified finger, initial encounter for closed fracture 12/10/2013    Closed fracture of one or more phalanges of hand    Personal history of urinary calculi 12/01/2017    History of renal calculi   [2]   Past Surgical History:  Procedure Laterality Date    ANTERIOR CRUCIATE LIGAMENT REPAIR  12/04/2014    Primary Repair Of Knee Ligament Cruciate Anterior   [3]   Family History  Problem Relation Name Age of Onset    Hypertension Father      Diabetes Father      Breast cancer Sister     [4]   Current Outpatient Medications on File Prior to Visit   Medication Sig Dispense Refill    polyethylene glycol (Miralax) 17 gram packet Take 17 g by mouth once daily as needed (as needed for constipation). 30 packet 2    wheat dextrin (Benefiber Healthy Shape) 5 gram/7.4 gram powder Take 1 teaspoon daily 248 g 1    [DISCONTINUED] amphetamine-dextroamphetamine XR (Adderall XR) 15 mg 24 hr capsule Take 1 capsule (15 mg) by mouth 2 times a day. Do not crush or chew. 60 capsule 0    [DISCONTINUED] lisinopril 20 mg tablet Take 1 tablet (20 mg) by mouth once daily. 90 tablet 2    [DISCONTINUED] albuterol 90 mcg/actuation inhaler Inhale 2 puffs every 6 hours if needed for wheezing. (Patient not taking: Reported on 7/30/2025) 18 g 0    [DISCONTINUED] amphetamine-dextroamphetamine XR (Adderall XR) 15 mg 24 hr capsule Take 1 capsule (15 mg) by mouth 2 times a day. Do not crush or chew. Do not fill before April 18, 2025. 60 capsule 0    [DISCONTINUED] amphetamine-dextroamphetamine XR (Adderall XR) 15 mg 24 hr capsule Take 1 capsule (15 mg) by mouth 2 times a day. Do not crush or chew. Do not fill before May 16, 2025. 60 capsule 0     No current facility-administered medications on file prior to visit.   [5]   Allergies  Allergen Reactions    Penicillins Unknown

## 2026-01-28 ENCOUNTER — APPOINTMENT (OUTPATIENT)
Dept: PRIMARY CARE | Facility: CLINIC | Age: 51
End: 2026-01-28
Payer: COMMERCIAL